# Patient Record
Sex: MALE | Race: WHITE | Employment: OTHER | URBAN - METROPOLITAN AREA
[De-identification: names, ages, dates, MRNs, and addresses within clinical notes are randomized per-mention and may not be internally consistent; named-entity substitution may affect disease eponyms.]

---

## 2017-02-03 ENCOUNTER — TELEPHONE (OUTPATIENT)
Dept: FAMILY MEDICINE CLINIC | Age: 82
End: 2017-02-03

## 2017-02-03 RX ORDER — AZITHROMYCIN 250 MG/1
TABLET, FILM COATED ORAL
Qty: 6 TABLET | Refills: 0 | Status: SHIPPED | OUTPATIENT
Start: 2017-02-03 | End: 2017-02-13

## 2019-03-13 ENCOUNTER — HOSPITAL ENCOUNTER (INPATIENT)
Age: 84
LOS: 4 days | Discharge: NURSING, REHAB OR LONG-TERM CARE | DRG: 690 | End: 2019-03-17
Attending: EMERGENCY MEDICINE | Admitting: INTERNAL MEDICINE
Payer: MEDICARE

## 2019-03-13 ENCOUNTER — APPOINTMENT (OUTPATIENT)
Dept: CT IMAGING | Age: 84
DRG: 690 | End: 2019-03-13
Payer: MEDICARE

## 2019-03-13 ENCOUNTER — APPOINTMENT (OUTPATIENT)
Dept: GENERAL RADIOLOGY | Age: 84
DRG: 690 | End: 2019-03-13
Payer: MEDICARE

## 2019-03-13 DIAGNOSIS — Z91.81 AT RISK FOR FALLS: ICD-10-CM

## 2019-03-13 DIAGNOSIS — N30.00 ACUTE CYSTITIS WITHOUT HEMATURIA: Primary | ICD-10-CM

## 2019-03-13 DIAGNOSIS — R53.1 GENERALIZED WEAKNESS: ICD-10-CM

## 2019-03-13 DIAGNOSIS — R41.0 DELIRIUM: ICD-10-CM

## 2019-03-13 DIAGNOSIS — D72.829 LEUKOCYTOSIS, UNSPECIFIED TYPE: ICD-10-CM

## 2019-03-13 PROBLEM — N39.0 UTI (URINARY TRACT INFECTION): Status: ACTIVE | Noted: 2019-03-13

## 2019-03-13 LAB
A/G RATIO: 1.3 (ref 1.1–2.2)
ALBUMIN SERPL-MCNC: 4 G/DL (ref 3.4–5)
ALP BLD-CCNC: 80 U/L (ref 40–129)
ALT SERPL-CCNC: 15 U/L (ref 10–40)
ANION GAP SERPL CALCULATED.3IONS-SCNC: 13 MMOL/L (ref 3–16)
AST SERPL-CCNC: 18 U/L (ref 15–37)
BACTERIA: ABNORMAL /HPF
BASOPHILS ABSOLUTE: 0.1 K/UL (ref 0–0.2)
BASOPHILS RELATIVE PERCENT: 0.5 %
BILIRUB SERPL-MCNC: 0.7 MG/DL (ref 0–1)
BILIRUBIN URINE: NEGATIVE
BLOOD, URINE: NEGATIVE
BUN BLDV-MCNC: 20 MG/DL (ref 7–20)
CALCIUM SERPL-MCNC: 9.2 MG/DL (ref 8.3–10.6)
CHLORIDE BLD-SCNC: 98 MMOL/L (ref 99–110)
CLARITY: CLEAR
CO2: 26 MMOL/L (ref 21–32)
COLOR: YELLOW
CREAT SERPL-MCNC: 0.9 MG/DL (ref 0.8–1.3)
EKG ATRIAL RATE: 87 BPM
EKG DIAGNOSIS: NORMAL
EKG Q-T INTERVAL: 358 MS
EKG QRS DURATION: 88 MS
EKG QTC CALCULATION (BAZETT): 412 MS
EKG R AXIS: 40 DEGREES
EKG T AXIS: -7 DEGREES
EKG VENTRICULAR RATE: 80 BPM
EOSINOPHILS ABSOLUTE: 0.2 K/UL (ref 0–0.6)
EOSINOPHILS RELATIVE PERCENT: 1.2 %
EPITHELIAL CELLS, UA: ABNORMAL /HPF
GFR AFRICAN AMERICAN: >60
GFR NON-AFRICAN AMERICAN: >60
GLOBULIN: 3.2 G/DL
GLUCOSE BLD-MCNC: 129 MG/DL (ref 70–99)
GLUCOSE URINE: NEGATIVE MG/DL
HCT VFR BLD CALC: 39.9 % (ref 40.5–52.5)
HEMOGLOBIN: 13.3 G/DL (ref 13.5–17.5)
INR BLD: 2.46 (ref 0.86–1.14)
KETONES, URINE: NEGATIVE MG/DL
LACTIC ACID: 1.2 MMOL/L (ref 0.4–2)
LEUKOCYTE ESTERASE, URINE: ABNORMAL
LYMPHOCYTES ABSOLUTE: 2 K/UL (ref 1–5.1)
LYMPHOCYTES RELATIVE PERCENT: 13.9 %
MCH RBC QN AUTO: 31.2 PG (ref 26–34)
MCHC RBC AUTO-ENTMCNC: 33.4 G/DL (ref 31–36)
MCV RBC AUTO: 93.7 FL (ref 80–100)
MICROSCOPIC EXAMINATION: YES
MONOCYTES ABSOLUTE: 1.4 K/UL (ref 0–1.3)
MONOCYTES RELATIVE PERCENT: 9.5 %
MUCUS: ABNORMAL /LPF
NEUTROPHILS ABSOLUTE: 10.9 K/UL (ref 1.7–7.7)
NEUTROPHILS RELATIVE PERCENT: 74.9 %
NITRITE, URINE: NEGATIVE
PDW BLD-RTO: 14.4 % (ref 12.4–15.4)
PH UA: 7 (ref 5–8)
PLATELET # BLD: 224 K/UL (ref 135–450)
PMV BLD AUTO: 7.3 FL (ref 5–10.5)
POTASSIUM SERPL-SCNC: 4.3 MMOL/L (ref 3.5–5.1)
PROTEIN UA: ABNORMAL MG/DL
PROTHROMBIN TIME: 28 SEC (ref 9.8–13)
RBC # BLD: 4.26 M/UL (ref 4.2–5.9)
RBC UA: ABNORMAL /HPF (ref 0–2)
SODIUM BLD-SCNC: 137 MMOL/L (ref 136–145)
SPECIFIC GRAVITY UA: 1.01 (ref 1–1.03)
SPECIMEN STATUS: NORMAL
TOTAL CK: 229 U/L (ref 39–308)
TOTAL PROTEIN: 7.2 G/DL (ref 6.4–8.2)
TROPONIN: 0.02 NG/ML
URINE TYPE: ABNORMAL
UROBILINOGEN, URINE: 0.2 E.U./DL
WBC # BLD: 14.6 K/UL (ref 4–11)
WBC UA: ABNORMAL /HPF (ref 0–5)

## 2019-03-13 PROCEDURE — 82550 ASSAY OF CK (CPK): CPT

## 2019-03-13 PROCEDURE — 93010 ELECTROCARDIOGRAM REPORT: CPT | Performed by: INTERNAL MEDICINE

## 2019-03-13 PROCEDURE — 93005 ELECTROCARDIOGRAM TRACING: CPT | Performed by: EMERGENCY MEDICINE

## 2019-03-13 PROCEDURE — 6360000002 HC RX W HCPCS: Performed by: EMERGENCY MEDICINE

## 2019-03-13 PROCEDURE — 6370000000 HC RX 637 (ALT 250 FOR IP): Performed by: INTERNAL MEDICINE

## 2019-03-13 PROCEDURE — 85025 COMPLETE CBC W/AUTO DIFF WBC: CPT

## 2019-03-13 PROCEDURE — 2700000000 HC OXYGEN THERAPY PER DAY

## 2019-03-13 PROCEDURE — 36415 COLL VENOUS BLD VENIPUNCTURE: CPT

## 2019-03-13 PROCEDURE — 96365 THER/PROPH/DIAG IV INF INIT: CPT

## 2019-03-13 PROCEDURE — 85610 PROTHROMBIN TIME: CPT

## 2019-03-13 PROCEDURE — 2580000003 HC RX 258: Performed by: EMERGENCY MEDICINE

## 2019-03-13 PROCEDURE — 87040 BLOOD CULTURE FOR BACTERIA: CPT

## 2019-03-13 PROCEDURE — 72125 CT NECK SPINE W/O DYE: CPT

## 2019-03-13 PROCEDURE — 70450 CT HEAD/BRAIN W/O DYE: CPT

## 2019-03-13 PROCEDURE — 71046 X-RAY EXAM CHEST 2 VIEWS: CPT

## 2019-03-13 PROCEDURE — 99285 EMERGENCY DEPT VISIT HI MDM: CPT

## 2019-03-13 PROCEDURE — 81001 URINALYSIS AUTO W/SCOPE: CPT

## 2019-03-13 PROCEDURE — 1200000000 HC SEMI PRIVATE

## 2019-03-13 PROCEDURE — 83605 ASSAY OF LACTIC ACID: CPT

## 2019-03-13 PROCEDURE — 2580000003 HC RX 258: Performed by: INTERNAL MEDICINE

## 2019-03-13 PROCEDURE — 80053 COMPREHEN METABOLIC PANEL: CPT

## 2019-03-13 PROCEDURE — 84484 ASSAY OF TROPONIN QUANT: CPT

## 2019-03-13 PROCEDURE — 94761 N-INVAS EAR/PLS OXIMETRY MLT: CPT

## 2019-03-13 RX ORDER — PRAVASTATIN SODIUM 40 MG
40 TABLET ORAL DAILY
Status: DISCONTINUED | OUTPATIENT
Start: 2019-03-14 | End: 2019-03-17 | Stop reason: HOSPADM

## 2019-03-13 RX ORDER — LISINOPRIL AND HYDROCHLOROTHIAZIDE 12.5; 1 MG/1; MG/1
1 TABLET ORAL DAILY
Status: DISCONTINUED | OUTPATIENT
Start: 2019-03-13 | End: 2019-03-13 | Stop reason: CLARIF

## 2019-03-13 RX ORDER — MULTIVIT-MIN/IRON/FOLIC ACID/K 18-600-40
2000 CAPSULE ORAL DAILY
COMMUNITY

## 2019-03-13 RX ORDER — SODIUM CHLORIDE 0.9 % (FLUSH) 0.9 %
10 SYRINGE (ML) INJECTION PRN
Status: DISCONTINUED | OUTPATIENT
Start: 2019-03-13 | End: 2019-03-17 | Stop reason: HOSPADM

## 2019-03-13 RX ORDER — LISINOPRIL 10 MG/1
10 TABLET ORAL DAILY
Status: DISCONTINUED | OUTPATIENT
Start: 2019-03-13 | End: 2019-03-17 | Stop reason: HOSPADM

## 2019-03-13 RX ORDER — ONDANSETRON 2 MG/ML
4 INJECTION INTRAMUSCULAR; INTRAVENOUS EVERY 6 HOURS PRN
Status: DISCONTINUED | OUTPATIENT
Start: 2019-03-13 | End: 2019-03-17 | Stop reason: HOSPADM

## 2019-03-13 RX ORDER — AZELASTINE 1 MG/ML
1 SPRAY, METERED NASAL 2 TIMES DAILY
Status: DISCONTINUED | OUTPATIENT
Start: 2019-03-14 | End: 2019-03-17 | Stop reason: HOSPADM

## 2019-03-13 RX ORDER — POTASSIUM CHLORIDE 750 MG/1
10 TABLET, EXTENDED RELEASE ORAL 2 TIMES DAILY
Status: DISCONTINUED | OUTPATIENT
Start: 2019-03-13 | End: 2019-03-17 | Stop reason: HOSPADM

## 2019-03-13 RX ORDER — HYDROCHLOROTHIAZIDE 25 MG/1
12.5 TABLET ORAL DAILY
Status: DISCONTINUED | OUTPATIENT
Start: 2019-03-13 | End: 2019-03-17 | Stop reason: HOSPADM

## 2019-03-13 RX ORDER — WARFARIN SODIUM 5 MG/1
5 TABLET ORAL DAILY
Status: DISCONTINUED | OUTPATIENT
Start: 2019-03-14 | End: 2019-03-17 | Stop reason: HOSPADM

## 2019-03-13 RX ORDER — 0.9 % SODIUM CHLORIDE 0.9 %
500 INTRAVENOUS SOLUTION INTRAVENOUS ONCE
Status: COMPLETED | OUTPATIENT
Start: 2019-03-13 | End: 2019-03-13

## 2019-03-13 RX ORDER — ACETAMINOPHEN 325 MG/1
650 TABLET ORAL EVERY 4 HOURS PRN
Status: DISCONTINUED | OUTPATIENT
Start: 2019-03-13 | End: 2019-03-17 | Stop reason: HOSPADM

## 2019-03-13 RX ORDER — DOCUSATE SODIUM 100 MG/1
100 CAPSULE, LIQUID FILLED ORAL 2 TIMES DAILY
Status: DISCONTINUED | OUTPATIENT
Start: 2019-03-13 | End: 2019-03-17 | Stop reason: HOSPADM

## 2019-03-13 RX ORDER — SODIUM CHLORIDE 0.9 % (FLUSH) 0.9 %
10 SYRINGE (ML) INJECTION EVERY 12 HOURS SCHEDULED
Status: DISCONTINUED | OUTPATIENT
Start: 2019-03-13 | End: 2019-03-17 | Stop reason: HOSPADM

## 2019-03-13 RX ORDER — DILTIAZEM HYDROCHLORIDE 240 MG/1
240 CAPSULE, COATED, EXTENDED RELEASE ORAL DAILY
Status: DISCONTINUED | OUTPATIENT
Start: 2019-03-13 | End: 2019-03-17 | Stop reason: HOSPADM

## 2019-03-13 RX ORDER — FLUOXETINE HYDROCHLORIDE 20 MG/1
20 CAPSULE ORAL DAILY
Status: DISCONTINUED | OUTPATIENT
Start: 2019-03-14 | End: 2019-03-17 | Stop reason: HOSPADM

## 2019-03-13 RX ORDER — DIGOXIN 125 MCG
250 TABLET ORAL DAILY
Status: DISCONTINUED | OUTPATIENT
Start: 2019-03-14 | End: 2019-03-17 | Stop reason: HOSPADM

## 2019-03-13 RX ORDER — CARBOXYMETHYLCELLULOSE SODIUM 10 MG/ML
1 GEL OPHTHALMIC 3 TIMES DAILY
Status: DISCONTINUED | OUTPATIENT
Start: 2019-03-13 | End: 2019-03-17 | Stop reason: HOSPADM

## 2019-03-13 RX ADMIN — CARBOXYMETHYLCELLULOSE SODIUM 1 DROP: 10 GEL OPHTHALMIC at 20:24

## 2019-03-13 RX ADMIN — DOCUSATE SODIUM 100 MG: 100 CAPSULE, LIQUID FILLED ORAL at 20:24

## 2019-03-13 RX ADMIN — POTASSIUM CHLORIDE 10 MEQ: 750 TABLET, EXTENDED RELEASE ORAL at 20:24

## 2019-03-13 RX ADMIN — HYDROCHLOROTHIAZIDE 12.5 MG: 25 TABLET ORAL at 20:23

## 2019-03-13 RX ADMIN — LISINOPRIL 10 MG: 10 TABLET ORAL at 20:24

## 2019-03-13 RX ADMIN — SODIUM CHLORIDE, PRESERVATIVE FREE 10 ML: 5 INJECTION INTRAVENOUS at 20:24

## 2019-03-13 RX ADMIN — DILTIAZEM HYDROCHLORIDE 240 MG: 240 CAPSULE, COATED, EXTENDED RELEASE ORAL at 20:24

## 2019-03-13 RX ADMIN — SODIUM CHLORIDE 500 ML: 9 INJECTION, SOLUTION INTRAVENOUS at 14:16

## 2019-03-13 RX ADMIN — CEFTRIAXONE SODIUM 1 G: 1 INJECTION, POWDER, FOR SOLUTION INTRAMUSCULAR; INTRAVENOUS at 14:43

## 2019-03-13 ASSESSMENT — PAIN SCALES - GENERAL: PAINLEVEL_OUTOF10: 0

## 2019-03-14 LAB
ALBUMIN SERPL-MCNC: 4 G/DL (ref 3.4–5)
ANION GAP SERPL CALCULATED.3IONS-SCNC: 12 MMOL/L (ref 3–16)
BUN BLDV-MCNC: 16 MG/DL (ref 7–20)
CALCIUM SERPL-MCNC: 9.1 MG/DL (ref 8.3–10.6)
CHLORIDE BLD-SCNC: 100 MMOL/L (ref 99–110)
CO2: 24 MMOL/L (ref 21–32)
CREAT SERPL-MCNC: 0.7 MG/DL (ref 0.8–1.3)
GFR AFRICAN AMERICAN: >60
GFR NON-AFRICAN AMERICAN: >60
GLUCOSE BLD-MCNC: 177 MG/DL (ref 70–99)
HCT VFR BLD CALC: 39.7 % (ref 40.5–52.5)
HEMOGLOBIN: 13.6 G/DL (ref 13.5–17.5)
INR BLD: 2.32 (ref 0.86–1.14)
MCH RBC QN AUTO: 31.6 PG (ref 26–34)
MCHC RBC AUTO-ENTMCNC: 34.3 G/DL (ref 31–36)
MCV RBC AUTO: 92.2 FL (ref 80–100)
PDW BLD-RTO: 14.4 % (ref 12.4–15.4)
PHOSPHORUS: 2.2 MG/DL (ref 2.5–4.9)
PLATELET # BLD: 206 K/UL (ref 135–450)
PMV BLD AUTO: 7.7 FL (ref 5–10.5)
POTASSIUM SERPL-SCNC: 4.1 MMOL/L (ref 3.5–5.1)
PROTHROMBIN TIME: 26.5 SEC (ref 9.8–13)
RBC # BLD: 4.3 M/UL (ref 4.2–5.9)
SODIUM BLD-SCNC: 136 MMOL/L (ref 136–145)
WBC # BLD: 17.3 K/UL (ref 4–11)

## 2019-03-14 PROCEDURE — 1200000000 HC SEMI PRIVATE

## 2019-03-14 PROCEDURE — 6370000000 HC RX 637 (ALT 250 FOR IP): Performed by: INTERNAL MEDICINE

## 2019-03-14 PROCEDURE — 97530 THERAPEUTIC ACTIVITIES: CPT

## 2019-03-14 PROCEDURE — 97535 SELF CARE MNGMENT TRAINING: CPT

## 2019-03-14 PROCEDURE — 6360000002 HC RX W HCPCS: Performed by: INTERNAL MEDICINE

## 2019-03-14 PROCEDURE — 80069 RENAL FUNCTION PANEL: CPT

## 2019-03-14 PROCEDURE — 85610 PROTHROMBIN TIME: CPT

## 2019-03-14 PROCEDURE — 97162 PT EVAL MOD COMPLEX 30 MIN: CPT

## 2019-03-14 PROCEDURE — 85027 COMPLETE CBC AUTOMATED: CPT

## 2019-03-14 PROCEDURE — 97110 THERAPEUTIC EXERCISES: CPT

## 2019-03-14 PROCEDURE — 2580000003 HC RX 258: Performed by: INTERNAL MEDICINE

## 2019-03-14 PROCEDURE — 36415 COLL VENOUS BLD VENIPUNCTURE: CPT

## 2019-03-14 PROCEDURE — 97167 OT EVAL HIGH COMPLEX 60 MIN: CPT

## 2019-03-14 RX ORDER — SODIUM CHLORIDE 9 MG/ML
INJECTION, SOLUTION INTRAVENOUS
Status: DISPENSED
Start: 2019-03-14 | End: 2019-03-14

## 2019-03-14 RX ADMIN — POTASSIUM CHLORIDE 10 MEQ: 750 TABLET, EXTENDED RELEASE ORAL at 21:36

## 2019-03-14 RX ADMIN — HYDROCHLOROTHIAZIDE 12.5 MG: 25 TABLET ORAL at 09:27

## 2019-03-14 RX ADMIN — AZELASTINE HYDROCHLORIDE 1 SPRAY: 137 SPRAY, METERED NASAL at 21:38

## 2019-03-14 RX ADMIN — CARBOXYMETHYLCELLULOSE SODIUM 1 DROP: 10 GEL OPHTHALMIC at 21:38

## 2019-03-14 RX ADMIN — SODIUM CHLORIDE, PRESERVATIVE FREE 10 ML: 5 INJECTION INTRAVENOUS at 21:37

## 2019-03-14 RX ADMIN — AZELASTINE HYDROCHLORIDE 1 SPRAY: 137 SPRAY, METERED NASAL at 12:45

## 2019-03-14 RX ADMIN — LISINOPRIL 10 MG: 10 TABLET ORAL at 09:27

## 2019-03-14 RX ADMIN — FLUOXETINE 20 MG: 20 CAPSULE ORAL at 09:27

## 2019-03-14 RX ADMIN — DOCUSATE SODIUM 100 MG: 100 CAPSULE, LIQUID FILLED ORAL at 09:27

## 2019-03-14 RX ADMIN — DILTIAZEM HYDROCHLORIDE 240 MG: 240 CAPSULE, COATED, EXTENDED RELEASE ORAL at 09:27

## 2019-03-14 RX ADMIN — PRAVASTATIN SODIUM 40 MG: 40 TABLET ORAL at 09:27

## 2019-03-14 RX ADMIN — DIGOXIN 250 MCG: 125 TABLET ORAL at 09:27

## 2019-03-14 RX ADMIN — WARFARIN SODIUM 5 MG: 5 TABLET ORAL at 17:38

## 2019-03-14 RX ADMIN — CEFTRIAXONE SODIUM 1 G: 1 INJECTION, POWDER, FOR SOLUTION INTRAMUSCULAR; INTRAVENOUS at 09:27

## 2019-03-14 RX ADMIN — POTASSIUM CHLORIDE 10 MEQ: 750 TABLET, EXTENDED RELEASE ORAL at 09:27

## 2019-03-14 RX ADMIN — CARBOXYMETHYLCELLULOSE SODIUM 1 DROP: 10 GEL OPHTHALMIC at 09:43

## 2019-03-14 RX ADMIN — SODIUM CHLORIDE, PRESERVATIVE FREE 10 ML: 5 INJECTION INTRAVENOUS at 09:27

## 2019-03-14 RX ADMIN — DOCUSATE SODIUM 100 MG: 100 CAPSULE, LIQUID FILLED ORAL at 21:36

## 2019-03-14 RX ADMIN — CARBOXYMETHYLCELLULOSE SODIUM 1 DROP: 10 GEL OPHTHALMIC at 15:17

## 2019-03-14 ASSESSMENT — PAIN DESCRIPTION - PAIN TYPE
TYPE: CHRONIC PAIN

## 2019-03-14 ASSESSMENT — ENCOUNTER SYMPTOMS
COLOR CHANGE: 0
ABDOMINAL PAIN: 0
BACK PAIN: 1
SHORTNESS OF BREATH: 0
CHEST TIGHTNESS: 0
COUGH: 0
NAUSEA: 0
DIARRHEA: 0
VOMITING: 0

## 2019-03-14 ASSESSMENT — PAIN SCALES - GENERAL
PAINLEVEL_OUTOF10: 1
PAINLEVEL_OUTOF10: 1
PAINLEVEL_OUTOF10: 2

## 2019-03-14 ASSESSMENT — PAIN DESCRIPTION - LOCATION
LOCATION: SHOULDER
LOCATION: SHOULDER

## 2019-03-15 LAB
INR BLD: 2.13 (ref 0.86–1.14)
PROTHROMBIN TIME: 24.3 SEC (ref 9.8–13)

## 2019-03-15 PROCEDURE — 6360000002 HC RX W HCPCS: Performed by: INTERNAL MEDICINE

## 2019-03-15 PROCEDURE — 2580000003 HC RX 258: Performed by: INTERNAL MEDICINE

## 2019-03-15 PROCEDURE — 85610 PROTHROMBIN TIME: CPT

## 2019-03-15 PROCEDURE — 6370000000 HC RX 637 (ALT 250 FOR IP): Performed by: INTERNAL MEDICINE

## 2019-03-15 PROCEDURE — 1200000000 HC SEMI PRIVATE

## 2019-03-15 PROCEDURE — 36415 COLL VENOUS BLD VENIPUNCTURE: CPT

## 2019-03-15 RX ADMIN — DIGOXIN 250 MCG: 125 TABLET ORAL at 09:13

## 2019-03-15 RX ADMIN — POTASSIUM CHLORIDE 10 MEQ: 750 TABLET, EXTENDED RELEASE ORAL at 23:11

## 2019-03-15 RX ADMIN — DOCUSATE SODIUM 100 MG: 100 CAPSULE, LIQUID FILLED ORAL at 23:11

## 2019-03-15 RX ADMIN — LISINOPRIL 10 MG: 10 TABLET ORAL at 09:13

## 2019-03-15 RX ADMIN — POTASSIUM CHLORIDE 10 MEQ: 750 TABLET, EXTENDED RELEASE ORAL at 09:12

## 2019-03-15 RX ADMIN — AZELASTINE HYDROCHLORIDE 1 SPRAY: 137 SPRAY, METERED NASAL at 09:14

## 2019-03-15 RX ADMIN — DOCUSATE SODIUM 100 MG: 100 CAPSULE, LIQUID FILLED ORAL at 10:03

## 2019-03-15 RX ADMIN — WARFARIN SODIUM 5 MG: 5 TABLET ORAL at 16:07

## 2019-03-15 RX ADMIN — FLUOXETINE 20 MG: 20 CAPSULE ORAL at 09:12

## 2019-03-15 RX ADMIN — AZELASTINE HYDROCHLORIDE 1 SPRAY: 137 SPRAY, METERED NASAL at 23:11

## 2019-03-15 RX ADMIN — SODIUM CHLORIDE, PRESERVATIVE FREE 10 ML: 5 INJECTION INTRAVENOUS at 23:16

## 2019-03-15 RX ADMIN — SODIUM CHLORIDE, PRESERVATIVE FREE 10 ML: 5 INJECTION INTRAVENOUS at 18:18

## 2019-03-15 RX ADMIN — CARBOXYMETHYLCELLULOSE SODIUM 1 DROP: 10 GEL OPHTHALMIC at 09:12

## 2019-03-15 RX ADMIN — ACETAMINOPHEN 650 MG: 325 TABLET ORAL at 02:23

## 2019-03-15 RX ADMIN — ONDANSETRON 4 MG: 2 INJECTION INTRAMUSCULAR; INTRAVENOUS at 18:18

## 2019-03-15 RX ADMIN — HYDROCHLOROTHIAZIDE 12.5 MG: 25 TABLET ORAL at 09:13

## 2019-03-15 RX ADMIN — PRAVASTATIN SODIUM 40 MG: 40 TABLET ORAL at 09:12

## 2019-03-15 RX ADMIN — CARBOXYMETHYLCELLULOSE SODIUM 1 DROP: 10 GEL OPHTHALMIC at 23:15

## 2019-03-15 RX ADMIN — ACETAMINOPHEN 650 MG: 325 TABLET ORAL at 23:12

## 2019-03-15 RX ADMIN — SODIUM CHLORIDE, PRESERVATIVE FREE 10 ML: 5 INJECTION INTRAVENOUS at 09:13

## 2019-03-15 RX ADMIN — CEFTRIAXONE SODIUM 1 G: 1 INJECTION, POWDER, FOR SOLUTION INTRAMUSCULAR; INTRAVENOUS at 09:13

## 2019-03-15 RX ADMIN — DILTIAZEM HYDROCHLORIDE 240 MG: 240 CAPSULE, COATED, EXTENDED RELEASE ORAL at 09:12

## 2019-03-15 RX ADMIN — CARBOXYMETHYLCELLULOSE SODIUM 1 DROP: 10 GEL OPHTHALMIC at 16:08

## 2019-03-15 ASSESSMENT — PAIN SCALES - GENERAL
PAINLEVEL_OUTOF10: 2
PAINLEVEL_OUTOF10: 0
PAINLEVEL_OUTOF10: 2
PAINLEVEL_OUTOF10: 2

## 2019-03-15 ASSESSMENT — PAIN DESCRIPTION - ORIENTATION: ORIENTATION: RIGHT;LEFT

## 2019-03-15 ASSESSMENT — PAIN DESCRIPTION - LOCATION
LOCATION: SHOULDER
LOCATION: WRIST;SHOULDER

## 2019-03-15 ASSESSMENT — PAIN DESCRIPTION - PAIN TYPE
TYPE: CHRONIC PAIN
TYPE: CHRONIC PAIN

## 2019-03-16 LAB
ALBUMIN SERPL-MCNC: 3.4 G/DL (ref 3.4–5)
ANION GAP SERPL CALCULATED.3IONS-SCNC: 12 MMOL/L (ref 3–16)
BUN BLDV-MCNC: 42 MG/DL (ref 7–20)
CALCIUM SERPL-MCNC: 9.1 MG/DL (ref 8.3–10.6)
CHLORIDE BLD-SCNC: 99 MMOL/L (ref 99–110)
CO2: 27 MMOL/L (ref 21–32)
CREAT SERPL-MCNC: 1 MG/DL (ref 0.8–1.3)
GFR AFRICAN AMERICAN: >60
GFR NON-AFRICAN AMERICAN: >60
GLUCOSE BLD-MCNC: 144 MG/DL (ref 70–99)
HCT VFR BLD CALC: 39.6 % (ref 40.5–52.5)
HEMOGLOBIN: 13.5 G/DL (ref 13.5–17.5)
INR BLD: 2.54 (ref 0.86–1.14)
MCH RBC QN AUTO: 31.5 PG (ref 26–34)
MCHC RBC AUTO-ENTMCNC: 34 G/DL (ref 31–36)
MCV RBC AUTO: 92.5 FL (ref 80–100)
PDW BLD-RTO: 14.6 % (ref 12.4–15.4)
PHOSPHORUS: 3.2 MG/DL (ref 2.5–4.9)
PLATELET # BLD: 231 K/UL (ref 135–450)
PMV BLD AUTO: 7.9 FL (ref 5–10.5)
POTASSIUM SERPL-SCNC: 4.1 MMOL/L (ref 3.5–5.1)
PROTHROMBIN TIME: 28.9 SEC (ref 9.8–13)
RBC # BLD: 4.28 M/UL (ref 4.2–5.9)
SODIUM BLD-SCNC: 138 MMOL/L (ref 136–145)
WBC # BLD: 13.9 K/UL (ref 4–11)

## 2019-03-16 PROCEDURE — 51798 US URINE CAPACITY MEASURE: CPT

## 2019-03-16 PROCEDURE — 51701 INSERT BLADDER CATHETER: CPT

## 2019-03-16 PROCEDURE — 85027 COMPLETE CBC AUTOMATED: CPT

## 2019-03-16 PROCEDURE — 2580000003 HC RX 258: Performed by: INTERNAL MEDICINE

## 2019-03-16 PROCEDURE — 6370000000 HC RX 637 (ALT 250 FOR IP): Performed by: INTERNAL MEDICINE

## 2019-03-16 PROCEDURE — 85610 PROTHROMBIN TIME: CPT

## 2019-03-16 PROCEDURE — 36415 COLL VENOUS BLD VENIPUNCTURE: CPT

## 2019-03-16 PROCEDURE — 80069 RENAL FUNCTION PANEL: CPT

## 2019-03-16 PROCEDURE — 1200000000 HC SEMI PRIVATE

## 2019-03-16 PROCEDURE — 6370000000 HC RX 637 (ALT 250 FOR IP): Performed by: FAMILY MEDICINE

## 2019-03-16 PROCEDURE — 6360000002 HC RX W HCPCS: Performed by: INTERNAL MEDICINE

## 2019-03-16 RX ORDER — TAMSULOSIN HYDROCHLORIDE 0.4 MG/1
0.4 CAPSULE ORAL NIGHTLY
Status: DISCONTINUED | OUTPATIENT
Start: 2019-03-16 | End: 2019-03-17 | Stop reason: HOSPADM

## 2019-03-16 RX ADMIN — DILTIAZEM HYDROCHLORIDE 240 MG: 240 CAPSULE, COATED, EXTENDED RELEASE ORAL at 09:49

## 2019-03-16 RX ADMIN — POTASSIUM CHLORIDE 10 MEQ: 750 TABLET, EXTENDED RELEASE ORAL at 09:48

## 2019-03-16 RX ADMIN — AZELASTINE HYDROCHLORIDE 1 SPRAY: 137 SPRAY, METERED NASAL at 21:07

## 2019-03-16 RX ADMIN — WARFARIN SODIUM 5 MG: 5 TABLET ORAL at 18:02

## 2019-03-16 RX ADMIN — CARBOXYMETHYLCELLULOSE SODIUM 1 DROP: 10 GEL OPHTHALMIC at 15:21

## 2019-03-16 RX ADMIN — DOCUSATE SODIUM 100 MG: 100 CAPSULE, LIQUID FILLED ORAL at 21:06

## 2019-03-16 RX ADMIN — LISINOPRIL 10 MG: 10 TABLET ORAL at 09:49

## 2019-03-16 RX ADMIN — SODIUM CHLORIDE, PRESERVATIVE FREE 10 ML: 5 INJECTION INTRAVENOUS at 09:50

## 2019-03-16 RX ADMIN — FLUOXETINE 20 MG: 20 CAPSULE ORAL at 09:49

## 2019-03-16 RX ADMIN — CARBOXYMETHYLCELLULOSE SODIUM 1 DROP: 10 GEL OPHTHALMIC at 21:08

## 2019-03-16 RX ADMIN — CEFTRIAXONE SODIUM 1 G: 1 INJECTION, POWDER, FOR SOLUTION INTRAMUSCULAR; INTRAVENOUS at 09:50

## 2019-03-16 RX ADMIN — DIGOXIN 250 MCG: 125 TABLET ORAL at 09:49

## 2019-03-16 RX ADMIN — PRAVASTATIN SODIUM 40 MG: 40 TABLET ORAL at 09:48

## 2019-03-16 RX ADMIN — DOCUSATE SODIUM 100 MG: 100 CAPSULE, LIQUID FILLED ORAL at 09:49

## 2019-03-16 RX ADMIN — TAMSULOSIN HYDROCHLORIDE 0.4 MG: 0.4 CAPSULE ORAL at 21:06

## 2019-03-16 RX ADMIN — HYDROCHLOROTHIAZIDE 12.5 MG: 25 TABLET ORAL at 09:49

## 2019-03-16 RX ADMIN — AZELASTINE HYDROCHLORIDE 1 SPRAY: 137 SPRAY, METERED NASAL at 09:52

## 2019-03-16 RX ADMIN — POTASSIUM CHLORIDE 10 MEQ: 750 TABLET, EXTENDED RELEASE ORAL at 21:06

## 2019-03-16 RX ADMIN — SODIUM CHLORIDE, PRESERVATIVE FREE 10 ML: 5 INJECTION INTRAVENOUS at 21:06

## 2019-03-16 ASSESSMENT — PAIN DESCRIPTION - LOCATION: LOCATION: ABDOMEN

## 2019-03-16 ASSESSMENT — PAIN DESCRIPTION - PAIN TYPE: TYPE: ACUTE PAIN

## 2019-03-16 ASSESSMENT — PAIN DESCRIPTION - ORIENTATION: ORIENTATION: MID

## 2019-03-16 ASSESSMENT — PAIN SCALES - GENERAL
PAINLEVEL_OUTOF10: 3
PAINLEVEL_OUTOF10: 0

## 2019-03-17 VITALS
SYSTOLIC BLOOD PRESSURE: 168 MMHG | WEIGHT: 192 LBS | HEART RATE: 87 BPM | DIASTOLIC BLOOD PRESSURE: 77 MMHG | HEIGHT: 72 IN | RESPIRATION RATE: 16 BRPM | OXYGEN SATURATION: 96 % | TEMPERATURE: 98.2 F | BODY MASS INDEX: 26.01 KG/M2

## 2019-03-17 LAB
CULTURE, BLOOD 2: ABNORMAL
CULTURE, BLOOD 2: ABNORMAL
INR BLD: 3.32 (ref 0.86–1.14)
ORGANISM: ABNORMAL
PROTHROMBIN TIME: 37.9 SEC (ref 9.8–13)

## 2019-03-17 PROCEDURE — 85610 PROTHROMBIN TIME: CPT

## 2019-03-17 PROCEDURE — 36415 COLL VENOUS BLD VENIPUNCTURE: CPT

## 2019-03-17 PROCEDURE — 6370000000 HC RX 637 (ALT 250 FOR IP): Performed by: INTERNAL MEDICINE

## 2019-03-17 PROCEDURE — 6360000002 HC RX W HCPCS: Performed by: INTERNAL MEDICINE

## 2019-03-17 PROCEDURE — 2580000003 HC RX 258: Performed by: INTERNAL MEDICINE

## 2019-03-17 RX ORDER — TAMSULOSIN HYDROCHLORIDE 0.4 MG/1
0.4 CAPSULE ORAL NIGHTLY
Qty: 30 CAPSULE | Refills: 3
Start: 2019-03-17

## 2019-03-17 RX ORDER — WARFARIN SODIUM 5 MG/1
TABLET ORAL
Qty: 30 TABLET | Refills: 3
Start: 2019-03-18 | End: 2019-07-30

## 2019-03-17 RX ADMIN — CEFTRIAXONE SODIUM 1 G: 1 INJECTION, POWDER, FOR SOLUTION INTRAMUSCULAR; INTRAVENOUS at 08:13

## 2019-03-17 RX ADMIN — LISINOPRIL 10 MG: 10 TABLET ORAL at 08:13

## 2019-03-17 RX ADMIN — IPRATROPIUM BROMIDE AND ALBUTEROL 2 PUFF: 20; 100 SPRAY, METERED RESPIRATORY (INHALATION) at 08:14

## 2019-03-17 RX ADMIN — HYDROCHLOROTHIAZIDE 12.5 MG: 25 TABLET ORAL at 08:13

## 2019-03-17 RX ADMIN — DOCUSATE SODIUM 100 MG: 100 CAPSULE, LIQUID FILLED ORAL at 08:13

## 2019-03-17 RX ADMIN — AZELASTINE HYDROCHLORIDE 1 SPRAY: 137 SPRAY, METERED NASAL at 08:14

## 2019-03-17 RX ADMIN — POTASSIUM CHLORIDE 10 MEQ: 750 TABLET, EXTENDED RELEASE ORAL at 08:13

## 2019-03-17 RX ADMIN — PRAVASTATIN SODIUM 40 MG: 40 TABLET ORAL at 08:13

## 2019-03-17 RX ADMIN — DILTIAZEM HYDROCHLORIDE 240 MG: 240 CAPSULE, COATED, EXTENDED RELEASE ORAL at 08:12

## 2019-03-17 RX ADMIN — DIGOXIN 250 MCG: 125 TABLET ORAL at 08:13

## 2019-03-17 RX ADMIN — CARBOXYMETHYLCELLULOSE SODIUM 1 DROP: 10 GEL OPHTHALMIC at 08:12

## 2019-03-17 RX ADMIN — FLUOXETINE 20 MG: 20 CAPSULE ORAL at 08:30

## 2019-03-17 RX ADMIN — SODIUM CHLORIDE, PRESERVATIVE FREE 10 ML: 5 INJECTION INTRAVENOUS at 08:13

## 2019-03-18 LAB — BLOOD CULTURE, ROUTINE: NORMAL

## 2019-04-12 PROBLEM — N39.0 UTI (URINARY TRACT INFECTION): Status: RESOLVED | Noted: 2019-03-13 | Resolved: 2019-04-12

## 2019-07-30 ENCOUNTER — HOSPITAL ENCOUNTER (EMERGENCY)
Age: 84
Discharge: OTHER INSTITUTION | End: 2019-07-30
Attending: EMERGENCY MEDICINE
Payer: MEDICARE

## 2019-07-30 VITALS
RESPIRATION RATE: 16 BRPM | SYSTOLIC BLOOD PRESSURE: 144 MMHG | OXYGEN SATURATION: 99 % | DIASTOLIC BLOOD PRESSURE: 75 MMHG | BODY MASS INDEX: 25.45 KG/M2 | WEIGHT: 192 LBS | HEART RATE: 78 BPM | TEMPERATURE: 98.2 F | HEIGHT: 73 IN

## 2019-07-30 DIAGNOSIS — T83.090A MECHANICAL COMPLICATION OF SUPRAPUBIC CATHETER, INITIAL ENCOUNTER (HCC): Primary | ICD-10-CM

## 2019-07-30 PROCEDURE — 99283 EMERGENCY DEPT VISIT LOW MDM: CPT

## 2019-07-30 PROCEDURE — 51702 INSERT TEMP BLADDER CATH: CPT

## 2019-07-30 RX ORDER — CIPROFLOXACIN 500 MG/1
500 TABLET, FILM COATED ORAL 2 TIMES DAILY
Status: ON HOLD | COMMUNITY
End: 2021-10-14 | Stop reason: HOSPADM

## 2019-07-30 RX ORDER — WATER / MINERAL OIL / WHITE PETROLATUM 16 OZ
CREAM TOPICAL PRN
COMMUNITY

## 2019-07-30 RX ORDER — FLUTICASONE FUROATE AND VILANTEROL 100; 25 UG/1; UG/1
2 POWDER RESPIRATORY (INHALATION) 2 TIMES DAILY
COMMUNITY

## 2019-07-30 RX ORDER — LISINOPRIL 20 MG/1
20 TABLET ORAL DAILY
COMMUNITY

## 2019-07-30 RX ORDER — LORAZEPAM 0.5 MG/1
0.5 TABLET ORAL EVERY 12 HOURS
COMMUNITY

## 2019-07-30 RX ORDER — LANOLIN ALCOHOL/MO/W.PET/CERES
9 CREAM (GRAM) TOPICAL DAILY
COMMUNITY

## 2019-08-11 ENCOUNTER — APPOINTMENT (OUTPATIENT)
Dept: CT IMAGING | Age: 84
End: 2019-08-11
Payer: MEDICARE

## 2019-08-11 ENCOUNTER — APPOINTMENT (OUTPATIENT)
Dept: GENERAL RADIOLOGY | Age: 84
End: 2019-08-11
Payer: MEDICARE

## 2019-08-11 ENCOUNTER — HOSPITAL ENCOUNTER (EMERGENCY)
Age: 84
Discharge: NURSING, REHAB OR LONG-TERM CARE | End: 2019-08-11
Attending: EMERGENCY MEDICINE
Payer: MEDICARE

## 2019-08-11 VITALS
HEIGHT: 70 IN | SYSTOLIC BLOOD PRESSURE: 164 MMHG | OXYGEN SATURATION: 100 % | DIASTOLIC BLOOD PRESSURE: 84 MMHG | TEMPERATURE: 98.2 F | HEART RATE: 85 BPM | RESPIRATION RATE: 20 BRPM | BODY MASS INDEX: 27.2 KG/M2 | WEIGHT: 190 LBS
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
## 2019-08-11 LAB
A/G RATIO: 1.2 (ref 1.1–2.2)
ALBUMIN SERPL-MCNC: 4.2 G/DL (ref 3.4–5)
ALP BLD-CCNC: 103 U/L (ref 40–129)
ALT SERPL-CCNC: 26 U/L (ref 10–40)
ANION GAP SERPL CALCULATED.3IONS-SCNC: 9 MMOL/L (ref 3–16)
AST SERPL-CCNC: 27 U/L (ref 15–37)
BASOPHILS ABSOLUTE: 0.1 K/UL (ref 0–0.2)
BASOPHILS RELATIVE PERCENT: 0.7 %
BILIRUB SERPL-MCNC: 0.8 MG/DL (ref 0–1)
BUN BLDV-MCNC: 12 MG/DL (ref 7–20)
CALCIUM SERPL-MCNC: 9.9 MG/DL (ref 8.3–10.6)
CHLORIDE BLD-SCNC: 98 MMOL/L (ref 99–110)
CO2: 28 MMOL/L (ref 21–32)
CREAT SERPL-MCNC: 0.6 MG/DL (ref 0.8–1.3)
EKG ATRIAL RATE: 107 BPM
EKG DIAGNOSIS: NORMAL
EKG Q-T INTERVAL: 322 MS
EKG QRS DURATION: 84 MS
EKG QTC CALCULATION (BAZETT): 411 MS
EKG R AXIS: 49 DEGREES
EKG T AXIS: 57 DEGREES
EKG VENTRICULAR RATE: 98 BPM
EOSINOPHILS ABSOLUTE: 0.2 K/UL (ref 0–0.6)
EOSINOPHILS RELATIVE PERCENT: 2.2 %
GFR AFRICAN AMERICAN: >60
GFR NON-AFRICAN AMERICAN: >60
GLOBULIN: 3.6 G/DL
GLUCOSE BLD-MCNC: 110 MG/DL (ref 70–99)
HCT VFR BLD CALC: 39.7 % (ref 40.5–52.5)
HEMOGLOBIN: 12.9 G/DL (ref 13.5–17.5)
LYMPHOCYTES ABSOLUTE: 0.9 K/UL (ref 1–5.1)
LYMPHOCYTES RELATIVE PERCENT: 10 %
MCH RBC QN AUTO: 29.8 PG (ref 26–34)
MCHC RBC AUTO-ENTMCNC: 32.5 G/DL (ref 31–36)
MCV RBC AUTO: 91.6 FL (ref 80–100)
MONOCYTES ABSOLUTE: 0.9 K/UL (ref 0–1.3)
MONOCYTES RELATIVE PERCENT: 9.6 %
NEUTROPHILS ABSOLUTE: 7 K/UL (ref 1.7–7.7)
NEUTROPHILS RELATIVE PERCENT: 77.5 %
PDW BLD-RTO: 15 % (ref 12.4–15.4)
PLATELET # BLD: 231 K/UL (ref 135–450)
PMV BLD AUTO: 7.6 FL (ref 5–10.5)
POTASSIUM REFLEX MAGNESIUM: 4.4 MMOL/L (ref 3.5–5.1)
RBC # BLD: 4.34 M/UL (ref 4.2–5.9)
SODIUM BLD-SCNC: 135 MMOL/L (ref 136–145)
TOTAL PROTEIN: 7.8 G/DL (ref 6.4–8.2)
TROPONIN: <0.01 NG/ML
WBC # BLD: 9.1 K/UL (ref 4–11)

## 2019-08-11 PROCEDURE — 36415 COLL VENOUS BLD VENIPUNCTURE: CPT

## 2019-08-11 PROCEDURE — 72125 CT NECK SPINE W/O DYE: CPT

## 2019-08-11 PROCEDURE — 80053 COMPREHEN METABOLIC PANEL: CPT

## 2019-08-11 PROCEDURE — 85025 COMPLETE CBC W/AUTO DIFF WBC: CPT

## 2019-08-11 PROCEDURE — 73560 X-RAY EXAM OF KNEE 1 OR 2: CPT

## 2019-08-11 PROCEDURE — 93005 ELECTROCARDIOGRAM TRACING: CPT | Performed by: EMERGENCY MEDICINE

## 2019-08-11 PROCEDURE — 84484 ASSAY OF TROPONIN QUANT: CPT

## 2019-08-11 PROCEDURE — 70450 CT HEAD/BRAIN W/O DYE: CPT

## 2019-08-11 PROCEDURE — 93010 ELECTROCARDIOGRAM REPORT: CPT | Performed by: INTERNAL MEDICINE

## 2019-08-11 PROCEDURE — 73070 X-RAY EXAM OF ELBOW: CPT

## 2019-08-11 PROCEDURE — 99285 EMERGENCY DEPT VISIT HI MDM: CPT

## 2019-08-11 ASSESSMENT — PAIN SCALES - GENERAL: PAINLEVEL_OUTOF10: 2

## 2019-08-11 ASSESSMENT — PAIN DESCRIPTION - DESCRIPTORS: DESCRIPTORS: ACHING;DISCOMFORT

## 2019-08-11 ASSESSMENT — PAIN DESCRIPTION - PAIN TYPE: TYPE: ACUTE PAIN

## 2019-08-11 ASSESSMENT — PAIN DESCRIPTION - FREQUENCY: FREQUENCY: INTERMITTENT

## 2019-08-11 ASSESSMENT — PAIN DESCRIPTION - LOCATION: LOCATION: HEAD

## 2019-08-11 NOTE — ED NOTES
Discharge instructions given to caregiver. Caregiver verbalized understanding. No distress noted. Pt and caregiver ambulatory from department without difficulty.      Da Ruiz RN  08/11/19 5041

## 2021-04-09 ENCOUNTER — APPOINTMENT (OUTPATIENT)
Dept: GENERAL RADIOLOGY | Age: 86
End: 2021-04-09
Payer: OTHER GOVERNMENT

## 2021-04-09 ENCOUNTER — HOSPITAL ENCOUNTER (EMERGENCY)
Age: 86
Discharge: ANOTHER HOSPITAL | End: 2021-04-09
Attending: EMERGENCY MEDICINE
Payer: OTHER GOVERNMENT

## 2021-04-09 VITALS
RESPIRATION RATE: 28 BRPM | BODY MASS INDEX: 27.2 KG/M2 | WEIGHT: 190 LBS | HEIGHT: 70 IN | DIASTOLIC BLOOD PRESSURE: 60 MMHG | SYSTOLIC BLOOD PRESSURE: 104 MMHG | OXYGEN SATURATION: 96 % | HEART RATE: 85 BPM | TEMPERATURE: 98.8 F

## 2021-04-09 DIAGNOSIS — N39.0 ACUTE UTI: Primary | ICD-10-CM

## 2021-04-09 DIAGNOSIS — R53.1 GENERALIZED WEAKNESS: ICD-10-CM

## 2021-04-09 DIAGNOSIS — I50.9 ACUTE ON CHRONIC CONGESTIVE HEART FAILURE, UNSPECIFIED HEART FAILURE TYPE (HCC): ICD-10-CM

## 2021-04-09 LAB
A/G RATIO: 1.2 (ref 1.1–2.2)
ALBUMIN SERPL-MCNC: 4.1 G/DL (ref 3.4–5)
ALP BLD-CCNC: 104 U/L (ref 40–129)
ALT SERPL-CCNC: 28 U/L (ref 10–40)
ANION GAP SERPL CALCULATED.3IONS-SCNC: 10 MMOL/L (ref 3–16)
AST SERPL-CCNC: 23 U/L (ref 15–37)
BACTERIA: ABNORMAL /HPF
BASOPHILS ABSOLUTE: 0.1 K/UL (ref 0–0.2)
BASOPHILS RELATIVE PERCENT: 0.4 %
BILIRUB SERPL-MCNC: 0.9 MG/DL (ref 0–1)
BILIRUBIN URINE: NEGATIVE
BLOOD, URINE: ABNORMAL
BUN BLDV-MCNC: 15 MG/DL (ref 7–20)
CALCIUM SERPL-MCNC: 9.9 MG/DL (ref 8.3–10.6)
CHLORIDE BLD-SCNC: 96 MMOL/L (ref 99–110)
CLARITY: ABNORMAL
CO2: 28 MMOL/L (ref 21–32)
COLOR: YELLOW
CREAT SERPL-MCNC: 0.7 MG/DL (ref 0.8–1.3)
EKG ATRIAL RATE: 227 BPM
EKG DIAGNOSIS: NORMAL
EKG Q-T INTERVAL: 380 MS
EKG QRS DURATION: 82 MS
EKG QTC CALCULATION (BAZETT): 446 MS
EKG R AXIS: 38 DEGREES
EKG T AXIS: 93 DEGREES
EKG VENTRICULAR RATE: 83 BPM
EOSINOPHILS ABSOLUTE: 0 K/UL (ref 0–0.6)
EOSINOPHILS RELATIVE PERCENT: 0.2 %
EPITHELIAL CELLS, UA: ABNORMAL /HPF (ref 0–5)
GFR AFRICAN AMERICAN: >60
GFR NON-AFRICAN AMERICAN: >60
GLOBULIN: 3.4 G/DL
GLUCOSE BLD-MCNC: 117 MG/DL (ref 70–99)
GLUCOSE URINE: NEGATIVE MG/DL
HCT VFR BLD CALC: 34.3 % (ref 40.5–52.5)
HEMOGLOBIN: 11.2 G/DL (ref 13.5–17.5)
KETONES, URINE: NEGATIVE MG/DL
LACTIC ACID: 1.5 MMOL/L (ref 0.4–2)
LEUKOCYTE ESTERASE, URINE: ABNORMAL
LYMPHOCYTES ABSOLUTE: 1.1 K/UL (ref 1–5.1)
LYMPHOCYTES RELATIVE PERCENT: 6.8 %
MCH RBC QN AUTO: 29.9 PG (ref 26–34)
MCHC RBC AUTO-ENTMCNC: 32.6 G/DL (ref 31–36)
MCV RBC AUTO: 91.6 FL (ref 80–100)
MICROSCOPIC EXAMINATION: YES
MONOCYTES ABSOLUTE: 1.6 K/UL (ref 0–1.3)
MONOCYTES RELATIVE PERCENT: 9.8 %
NEUTROPHILS ABSOLUTE: 13.2 K/UL (ref 1.7–7.7)
NEUTROPHILS RELATIVE PERCENT: 82.8 %
NITRITE, URINE: POSITIVE
PDW BLD-RTO: 14.7 % (ref 12.4–15.4)
PH UA: 7 (ref 5–8)
PLATELET # BLD: 246 K/UL (ref 135–450)
PMV BLD AUTO: 8.2 FL (ref 5–10.5)
POTASSIUM SERPL-SCNC: 4.5 MMOL/L (ref 3.5–5.1)
PRO-BNP: 1955 PG/ML (ref 0–449)
PROCALCITONIN: 0.1 NG/ML (ref 0–0.15)
PROTEIN UA: NEGATIVE MG/DL
RBC # BLD: 3.74 M/UL (ref 4.2–5.9)
RBC UA: ABNORMAL /HPF (ref 0–4)
SARS-COV-2, NAAT: NOT DETECTED
SODIUM BLD-SCNC: 134 MMOL/L (ref 136–145)
SPECIFIC GRAVITY UA: 1.01 (ref 1–1.03)
TOTAL PROTEIN: 7.5 G/DL (ref 6.4–8.2)
TROPONIN: 0.01 NG/ML
URINE REFLEX TO CULTURE: YES
URINE TYPE: ABNORMAL
UROBILINOGEN, URINE: 0.2 E.U./DL
WBC # BLD: 15.9 K/UL (ref 4–11)
WBC UA: ABNORMAL /HPF (ref 0–5)

## 2021-04-09 PROCEDURE — 81001 URINALYSIS AUTO W/SCOPE: CPT

## 2021-04-09 PROCEDURE — 84145 PROCALCITONIN (PCT): CPT

## 2021-04-09 PROCEDURE — 83605 ASSAY OF LACTIC ACID: CPT

## 2021-04-09 PROCEDURE — 84484 ASSAY OF TROPONIN QUANT: CPT

## 2021-04-09 PROCEDURE — 71045 X-RAY EXAM CHEST 1 VIEW: CPT

## 2021-04-09 PROCEDURE — 99285 EMERGENCY DEPT VISIT HI MDM: CPT

## 2021-04-09 PROCEDURE — 93005 ELECTROCARDIOGRAM TRACING: CPT | Performed by: EMERGENCY MEDICINE

## 2021-04-09 PROCEDURE — 87186 SC STD MICRODIL/AGAR DIL: CPT

## 2021-04-09 PROCEDURE — 87635 SARS-COV-2 COVID-19 AMP PRB: CPT

## 2021-04-09 PROCEDURE — 93010 ELECTROCARDIOGRAM REPORT: CPT | Performed by: INTERNAL MEDICINE

## 2021-04-09 PROCEDURE — 6360000002 HC RX W HCPCS: Performed by: EMERGENCY MEDICINE

## 2021-04-09 PROCEDURE — 87040 BLOOD CULTURE FOR BACTERIA: CPT

## 2021-04-09 PROCEDURE — 96365 THER/PROPH/DIAG IV INF INIT: CPT

## 2021-04-09 PROCEDURE — 85025 COMPLETE CBC W/AUTO DIFF WBC: CPT

## 2021-04-09 PROCEDURE — 87086 URINE CULTURE/COLONY COUNT: CPT

## 2021-04-09 PROCEDURE — 80053 COMPREHEN METABOLIC PANEL: CPT

## 2021-04-09 PROCEDURE — 83880 ASSAY OF NATRIURETIC PEPTIDE: CPT

## 2021-04-09 PROCEDURE — 96375 TX/PRO/DX INJ NEW DRUG ADDON: CPT

## 2021-04-09 PROCEDURE — 87088 URINE BACTERIA CULTURE: CPT

## 2021-04-09 PROCEDURE — 2580000003 HC RX 258: Performed by: EMERGENCY MEDICINE

## 2021-04-09 RX ORDER — FUROSEMIDE 10 MG/ML
20 INJECTION INTRAMUSCULAR; INTRAVENOUS ONCE
Status: COMPLETED | OUTPATIENT
Start: 2021-04-09 | End: 2021-04-09

## 2021-04-09 RX ADMIN — FUROSEMIDE 20 MG: 10 INJECTION, SOLUTION INTRAMUSCULAR; INTRAVENOUS at 18:05

## 2021-04-09 RX ADMIN — CEFEPIME HYDROCHLORIDE 2000 MG: 2 INJECTION, POWDER, FOR SOLUTION INTRAVENOUS at 15:38

## 2021-04-09 NOTE — ED NOTES
Bed: 03  Expected date: 4/9/21  Expected time: 1:00 PM  Means of arrival:   Comments:  [facility] [address], RN  04/09/21 0578

## 2021-04-10 NOTE — ED NOTES
Attempting to give report to [facility] for 4th time. RN called main number and had  transfer to unit. \"Porsche\",  answered.       Cordelia Ly, RN  04/09/21 9333

## 2021-04-10 NOTE — ED NOTES
14[facility] for [address] [facility] @ 3008  Re: UTI, CHF, weakness  1818- dr. Osei Pi accepted pt  [facility]  Kenya Monique got [address] [facility] forms @ [facility]  Faxed paper work back to [address] [facility] @ 2020   Room number was given  [address] [place] [address] , report number is [id number]     Transport with [facility] @ 2130     [facility]  04/09/21 2037

## 2021-04-10 NOTE — ED NOTES
Attempted to call report to [facility] at [phone]. No answer. Phone rang so long without answer, the call auto disconnected. RN called back a second time and phone rang without answer again. Will attempt again in 15 minutes.       Heydi Glover RN  04/09/21 2322

## 2021-04-10 NOTE — ED PROVIDER NOTES
CHIEF COMPLAINT  Fever (medics called for \"sick person\" pt has fever 104 by medics. bs 145   pt + wheezing pt on home oxygen 2 liters. tylenol given pta  approx 1240. hx dementia.  lives at home. pt has DNR comfort care.  )      HISTORY OF PRESENT ILLNESS  Ben Eagle is a 80 y.o. male with a history of COPD, hypertension, recurrent UTI, dementia, atrial fibrillation, [address] status who presents to the ED complaining of multiple complaints. Patient reports feeling unwell for the past few days but significantly worse today. Feels generally weak such that he is unable to ambulate according to his wife who supplements the history due to patient's underlying dementia. Febrile at home reported to be 104 prior to receiving Tylenol. Wife also reports wheezing and dyspnea as well as dark malodorous urine suspicious for UTI. States over the past week he has demonstrated increased pedal edema and she is concerned about fluid retention. No report of sick contacts, productive cough, nausea, vomiting, diarrhea. Patient denies chest pain or abdominal pain. No other complaints, modifying factors or associated symptoms. I have reviewed the following from the nursing documentation. Past Medical History:   Diagnosis Date    Alzheimer disease     Atrial fibrillation ([facility]     Bronchitis chronic     Chronic urinary tract infection     Emphysema of lung ([facility]     Hypertension     Pneumonia     Urinary retention      No past surgical history on file.   Family History   Problem Relation Age of Onset    Cancer Mother         colon    Emphysema Father     Asthma Father      Social History     Socioeconomic History    Marital status:      Spouse name: Not on file    Number of children: Not on file    Years of education: Not on file    Highest education level: Not on file   Occupational History    Not on file   Social Needs    Financial resource strain: Not on file    Food insecurity Worry: Not on file     Inability: Not on file    Transportation needs     Medical: Not on file     Non-medical: Not on file   Tobacco Use    Smoking status: Former Smoker     Packs/day: 2.00     Years: 24.00     Pack years: 48.00     Types: Cigarettes     Quit date: 1982     Years since quittin.2    Smokeless tobacco: Never Used   Substance and Sexual Activity    Alcohol use: No    Drug use: Not Currently    Sexual activity: Not Currently   Lifestyle    Physical activity     Days per week: Not on file     Minutes per session: Not on file    Stress: Not on file   Relationships    Social connections     Talks on phone: Not on file     Gets together: Not on file     Attends Mandaen service: Not on file     Active member of club or organization: Not on file     Attends meetings of clubs or organizations: Not on file     Relationship status: Not on file    Intimate partner violence     Fear of current or ex partner: Not on file     Emotionally abused: Not on file     Physically abused: Not on file     Forced sexual activity: Not on file   Other Topics Concern    Not on file   Social History Narrative    Not on file     No current facility-administered medications for this encounter. Current Outpatient Medications   Medication Sig Dispense Refill    apixaban (ELIQUIS) 5 MG TABS tablet Take 5 mg by mouth 2 times daily      LORazepam (ATIVAN) 0.5 MG tablet Take 0.5 mg by mouth every 12 hours.       fluticasone-vilanterol (BREO ELLIPTA) 100-25 MCG/INH AEPB inhaler Inhale 2 puffs into the lungs 2 times daily      tiotropium (SPIRIVA) 18 MCG inhalation capsule Inhale 18 mcg into the lungs daily      ciprofloxacin (CIPRO) 500 MG tablet Take 500 mg by mouth 2 times daily      lisinopril (PRINIVIL;ZESTRIL) 20 MG tablet Take 20 mg by mouth daily      melatonin 3 MG TABS tablet Take 9 mg by mouth daily      metoprolol tartrate (LOPRESSOR) 25 MG tablet Take 12.5 mg by mouth 2 times daily      Skin Protectants, Misc. (EUCERIN) cream Apply topically as needed for Dry Skin Apply topically as needed.  tamsulosin (FLOMAX) 0.4 MG capsule Take 1 capsule by mouth nightly (Patient taking differently: Take 0.8 mg by mouth nightly ) 30 capsule 3    Cholecalciferol (VITAMIN D) 2000 units CAPS capsule Take 2,000 Units by mouth daily       calcium carbonate (OSCAL) 500 MG TABS tablet Take 500 mg by mouth daily      pravastatin (PRAVACHOL) 40 MG tablet Take 20 mg by mouth daily       albuterol (PROVENTIL HFA;VENTOLIN HFA) 108 (90 BASE) MCG/ACT inhaler Inhale 2 puffs into the lungs every 6 hours as needed.  docusate sodium (COLACE) 100 MG capsule Take 100 mg by mouth 2 times daily. No Known Allergies    REVIEW OF SYSTEMS  10 systems reviewed, pertinent positives per HPI otherwise noted to be negative. PHYSICAL EXAM  /60   Pulse 85   Temp 98.8 °F (37.1 °C) (Oral)   Resp 28   Ht 5' 10\" (1.778 m)   Wt 190 lb (86.2 kg)   SpO2 96%   BMI 27.26 kg/m²   GENERAL APPEARANCE: Awake and alert. Cooperative. Appears elderly, frail, ill but nontoxic. HEAD: Normocephalic. Atraumatic. EYES: PERRL. EOM's grossly intact. ENT: Mucous membranes are moist.   NECK: Supple, trachea midline. HEART: Irregularly irregular. Normal S1S2, no rubs, gallops, or murmurs noted  LUNGS: Appears mildly dyspneic with mild tachypnea. Bibasilar rales and faint scattered expiratory wheezing. Speaking comfortably in full sentences. ABDOMEN: Soft. Non-distended. Non-tender. No guarding or rebound. Normal bowel sounds. EXTREMITIES: 1+ bilateral pitting edema. MAEE. No acute deformities. SKIN: Warm and dry. No acute rashes. NEUROLOGICAL: Alert and interactive, baseline dementia. CN II-XII intact. No gross facial drooping. Strength 5/5, sensation intact. Normal coordination. No pronator drift. No truncal instability. PSYCHIATRIC: Normal mood and affect. LABS  I have reviewed all labs for this visit.    Results for orders placed or performed during the hospital encounter of 04/09/21   COVID-19, Rapid    Specimen: Nasopharyngeal Swab   Result Value Ref Range    SARS-CoV-2, NAAT Not Detected Not Detected   CBC auto differential   Result Value Ref Range    WBC 15.9 (H) 4.0 - 11.0 K/uL    RBC 3.74 (L) 4.20 - 5.90 M/uL    Hemoglobin 11.2 (L) 13.5 - 17.5 g/dL    Hematocrit 34.3 (L) 40.5 - 52.5 %    MCV 91.6 80.0 - 100.0 fL    MCH 29.9 26.0 - 34.0 pg    MCHC 32.6 31.0 - 36.0 g/dL    RDW 14.7 12.4 - 15.4 %    Platelets 072 996 - 573 K/uL    MPV 8.2 5.0 - 10.5 fL    Neutrophils % 82.8 %    Lymphocytes % 6.8 %    Monocytes % 9.8 %    Eosinophils % 0.2 %    Basophils % 0.4 %    Neutrophils Absolute 13.2 (H) 1.7 - 7.7 K/uL    Lymphocytes Absolute 1.1 1.0 - 5.1 K/uL    Monocytes Absolute 1.6 (H) 0.0 - 1.3 K/uL    Eosinophils Absolute 0.0 0.0 - 0.6 K/uL    Basophils Absolute 0.1 0.0 - 0.2 K/uL   Comprehensive metabolic panel   Result Value Ref Range    Sodium 134 (L) 136 - 145 mmol/L    Potassium 4.5 3.5 - 5.1 mmol/L    Chloride 96 (L) 99 - 110 mmol/L    CO2 28 21 - 32 mmol/L    Anion Gap 10 3 - 16    Glucose 117 (H) 70 - 99 mg/dL    BUN 15 7 - 20 mg/dL    CREATININE 0.7 (L) 0.8 - 1.3 mg/dL    GFR Non-African American >60 >60    GFR African American >60 >60    Calcium 9.9 8.3 - 10.6 mg/dL    Total Protein 7.5 6.4 - 8.2 g/dL    Albumin 4.1 3.4 - 5.0 g/dL    Albumin/Globulin Ratio 1.2 1.1 - 2.2    Total Bilirubin 0.9 0.0 - 1.0 mg/dL    Alkaline Phosphatase 104 40 - 129 U/L    ALT 28 10 - 40 U/L    AST 23 15 - 37 U/L    Globulin 3.4 g/dL   Lactic acid, plasma   Result Value Ref Range    Lactic Acid 1.5 0.4 - 2.0 mmol/L   Urine, reflex to culture    Specimen: Urine, clean catch   Result Value Ref Range    Color, UA Yellow Straw/Yellow    Clarity, UA SL CLOUDY (A) Clear    Glucose, Ur Negative Negative mg/dL    Bilirubin Urine Negative Negative    Ketones, Urine Negative Negative mg/dL    Specific Gravity, UA 1.015 1.005 - 1.030    Blood, Urine TRACE-LYSED (A) Negative    pH, UA 7.0 5.0 - 8.0    Protein, UA Negative Negative mg/dL    Urobilinogen, Urine 0.2 <2.0 E.U./dL    Nitrite, Urine POSITIVE (A) Negative    Leukocyte Esterase, Urine LARGE (A) Negative    Microscopic Examination YES     Urine Type NotGiven     Urine Reflex to Culture Yes    Microscopic Urinalysis   Result Value Ref Range    WBC, UA 21-50 (A) 0 - 5 /HPF    RBC, UA None seen 0 - 4 /HPF    Epithelial Cells, UA 0-1 0 - 5 /HPF    Bacteria, UA 2+ (A) None Seen /HPF   Brain Natriuretic Peptide   Result Value Ref Range    Pro-BNP 1,955 (H) 0 - 449 pg/mL   Troponin   Result Value Ref Range    Troponin 0.01 <0.01 ng/mL   Procalcitonin   Result Value Ref Range    Procalcitonin 0.10 0.00 - 0.15 ng/mL   EKG 12 Lead   Result Value Ref Range    Ventricular Rate 83 BPM    Atrial Rate 227 BPM    QRS Duration 82 ms    Q-T Interval 380 ms    QTc Calculation (Bazett) 446 ms    R Axis 38 degrees    T Axis 93 degrees    Diagnosis       Atrial fibrillationST & T wave abnormality, consider lateral ischemia or digitalis effectAbnormal ECGWhen compared with ECG of 11-AUG-2019 10:45,T wave inversion now evident in Lateral leadsConfirmed by BEATRIS WOODY, [address] ([id number]) on 4/9/2021 8:50:19 PM       EKG  Atrial fibrillation, rate 83, normal axis, QTC within normal limits, no acute ST changes or T wave inversions. RADIOLOGY  X-RAYS:  I have reviewed radiologic plain film image(s). ALL OTHER NON-PLAIN FILM IMAGES SUCH AS CT, ULTRASOUND AND MRI HAVE BEEN READ BY THE RADIOLOGIST. XR CHEST PORTABLE   Final Result   No acute cardiopulmonary disease. Mild cardiomegaly without overt failure. Increased interstitial markings, likely chronic and stable. Rechecks: Physical assessment performed. Appears unchanged on reevaluation. Elderly, frail, ill but nontoxic. ED COURSE/MDM  Patient seen and evaluated. Old records reviewed. Labs and imaging reviewed and results discussed with patient.      Patient febrile and generally weak such that he is unable to safely ambulate. Found to have acute on chronic UTI. Also with shortness of breath and wheezing. Work-up suggestive of CHF and COPD exacerbation. Patient requests transfer to the [place] where his physicians are located. Case was discussed with Dr. Jack Pedro who has agreed to accept him. New Prescriptions    No medications on file       CLINICAL IMPRESSION  1. Acute UTI    2. Acute on chronic congestive heart failure, unspecified heart failure type ([address]    3. Generalized weakness        Blood pressure 104/60, pulse 85, temperature 98.8 °F (37.1 °C), temperature source Oral, resp. rate 28, height 5' 10\" (1.778 m), weight 190 lb (86.2 kg), SpO2 96 %. DISPOSITION  Brett Delgado was transferred in stable condition.         Emory Siddiqi MD  04/09/21 8748

## 2021-04-11 LAB
ORGANISM: ABNORMAL
URINE CULTURE, ROUTINE: ABNORMAL

## 2021-04-12 NOTE — RESULT ENCOUNTER NOTE
Culture reviewed, patient has been transferred to the [facility], please update on the results of the urine culture

## 2021-04-13 LAB
BLOOD CULTURE, ROUTINE: NORMAL
CULTURE, BLOOD 2: NORMAL

## 2021-10-09 ENCOUNTER — APPOINTMENT (OUTPATIENT)
Dept: GENERAL RADIOLOGY | Age: 86
DRG: 177 | End: 2021-10-09
Payer: OTHER GOVERNMENT

## 2021-10-09 ENCOUNTER — HOSPITAL ENCOUNTER (INPATIENT)
Age: 86
LOS: 5 days | Discharge: HOME HEALTH | DRG: 177 | End: 2021-10-14
Attending: EMERGENCY MEDICINE | Admitting: STUDENT IN AN ORGANIZED HEALTH CARE EDUCATION/TRAINING PROGRAM
Payer: OTHER GOVERNMENT

## 2021-10-09 ENCOUNTER — APPOINTMENT (OUTPATIENT)
Dept: CT IMAGING | Age: 86
DRG: 177 | End: 2021-10-09
Payer: OTHER GOVERNMENT

## 2021-10-09 DIAGNOSIS — I48.91 ATRIAL FIBRILLATION, UNSPECIFIED TYPE (HCC): ICD-10-CM

## 2021-10-09 DIAGNOSIS — U07.1 COVID-19: ICD-10-CM

## 2021-10-09 DIAGNOSIS — R09.02 HYPOXIA: Primary | ICD-10-CM

## 2021-10-09 PROBLEM — J12.82 PNEUMONIA DUE TO COVID-19 VIRUS: Status: ACTIVE | Noted: 2021-10-09

## 2021-10-09 PROBLEM — J96.01 ACUTE HYPOXEMIC RESPIRATORY FAILURE DUE TO COVID-19 (HCC): Status: ACTIVE | Noted: 2021-10-09

## 2021-10-09 LAB
A/G RATIO: 1.1 (ref 1.1–2.2)
ALBUMIN SERPL-MCNC: 3.6 G/DL (ref 3.4–5)
ALP BLD-CCNC: 118 U/L (ref 40–129)
ALT SERPL-CCNC: 60 U/L (ref 10–40)
ANION GAP SERPL CALCULATED.3IONS-SCNC: 11 MMOL/L (ref 3–16)
AST SERPL-CCNC: 49 U/L (ref 15–37)
BASE EXCESS VENOUS: 4.2 MMOL/L (ref -3–3)
BASOPHILS ABSOLUTE: 0 K/UL (ref 0–0.2)
BASOPHILS RELATIVE PERCENT: 0.3 %
BILIRUB SERPL-MCNC: 0.6 MG/DL (ref 0–1)
BUN BLDV-MCNC: 22 MG/DL (ref 7–20)
C-REACTIVE PROTEIN: 34.4 MG/L (ref 0–5.1)
CALCIUM SERPL-MCNC: 8.8 MG/DL (ref 8.3–10.6)
CARBOXYHEMOGLOBIN: 2.2 % (ref 0–1.5)
CHLORIDE BLD-SCNC: 96 MMOL/L (ref 99–110)
CO2: 27 MMOL/L (ref 21–32)
CREAT SERPL-MCNC: 0.7 MG/DL (ref 0.8–1.3)
EKG ATRIAL RATE: 119 BPM
EKG DIAGNOSIS: NORMAL
EKG Q-T INTERVAL: 346 MS
EKG QRS DURATION: 88 MS
EKG QTC CALCULATION (BAZETT): 459 MS
EKG R AXIS: 55 DEGREES
EKG T AXIS: 10 DEGREES
EKG VENTRICULAR RATE: 106 BPM
EOSINOPHILS ABSOLUTE: 0 K/UL (ref 0–0.6)
EOSINOPHILS RELATIVE PERCENT: 0.1 %
FERRITIN: 2506 NG/ML (ref 30–400)
GFR AFRICAN AMERICAN: >60
GFR NON-AFRICAN AMERICAN: >60
GLOBULIN: 3.3 G/DL
GLUCOSE BLD-MCNC: 145 MG/DL (ref 70–99)
HCO3 VENOUS: 27.9 MMOL/L (ref 23–29)
HCT VFR BLD CALC: 30.7 % (ref 40.5–52.5)
HEMOGLOBIN: 10.3 G/DL (ref 13.5–17.5)
LACTATE DEHYDROGENASE: 228 U/L (ref 100–190)
LACTIC ACID, SEPSIS: 1.1 MMOL/L (ref 0.4–1.9)
LACTIC ACID, SEPSIS: 1.2 MMOL/L (ref 0.4–1.9)
LYMPHOCYTES ABSOLUTE: 0.4 K/UL (ref 1–5.1)
LYMPHOCYTES RELATIVE PERCENT: 3.6 %
MAGNESIUM: 1.6 MG/DL (ref 1.8–2.4)
MCH RBC QN AUTO: 29.8 PG (ref 26–34)
MCHC RBC AUTO-ENTMCNC: 33.5 G/DL (ref 31–36)
MCV RBC AUTO: 88.9 FL (ref 80–100)
METHEMOGLOBIN VENOUS: 0.5 %
MONOCYTES ABSOLUTE: 0.7 K/UL (ref 0–1.3)
MONOCYTES RELATIVE PERCENT: 7.4 %
NEUTROPHILS ABSOLUTE: 8.7 K/UL (ref 1.7–7.7)
NEUTROPHILS RELATIVE PERCENT: 88.6 %
O2 SAT, VEN: 99 %
O2 THERAPY: ABNORMAL
PCO2, VEN: 38.1 MMHG (ref 40–50)
PDW BLD-RTO: 15.1 % (ref 12.4–15.4)
PH VENOUS: 7.48 (ref 7.35–7.45)
PLATELET # BLD: 162 K/UL (ref 135–450)
PMV BLD AUTO: 8.2 FL (ref 5–10.5)
PO2, VEN: 153.2 MMHG (ref 25–40)
POTASSIUM REFLEX MAGNESIUM: 3.3 MMOL/L (ref 3.5–5.1)
PRO-BNP: 2822 PG/ML (ref 0–449)
RBC # BLD: 3.46 M/UL (ref 4.2–5.9)
REASON FOR REJECTION: NORMAL
REJECTED TEST: NORMAL
SODIUM BLD-SCNC: 134 MMOL/L (ref 136–145)
SPECIMEN STATUS: NORMAL
TCO2 CALC VENOUS: 29 MMOL/L
TOTAL PROTEIN: 6.9 G/DL (ref 6.4–8.2)
TROPONIN: 0.04 NG/ML
WBC # BLD: 9.8 K/UL (ref 4–11)

## 2021-10-09 PROCEDURE — 6360000002 HC RX W HCPCS: Performed by: EMERGENCY MEDICINE

## 2021-10-09 PROCEDURE — 96365 THER/PROPH/DIAG IV INF INIT: CPT

## 2021-10-09 PROCEDURE — 85025 COMPLETE CBC W/AUTO DIFF WBC: CPT

## 2021-10-09 PROCEDURE — 99285 EMERGENCY DEPT VISIT HI MDM: CPT

## 2021-10-09 PROCEDURE — 80053 COMPREHEN METABOLIC PANEL: CPT

## 2021-10-09 PROCEDURE — 82728 ASSAY OF FERRITIN: CPT

## 2021-10-09 PROCEDURE — XW033E5 INTRODUCTION OF REMDESIVIR ANTI-INFECTIVE INTO PERIPHERAL VEIN, PERCUTANEOUS APPROACH, NEW TECHNOLOGY GROUP 5: ICD-10-PCS | Performed by: STUDENT IN AN ORGANIZED HEALTH CARE EDUCATION/TRAINING PROGRAM

## 2021-10-09 PROCEDURE — 83605 ASSAY OF LACTIC ACID: CPT

## 2021-10-09 PROCEDURE — 6360000004 HC RX CONTRAST MEDICATION: Performed by: EMERGENCY MEDICINE

## 2021-10-09 PROCEDURE — 2060000000 HC ICU INTERMEDIATE R&B

## 2021-10-09 PROCEDURE — 83880 ASSAY OF NATRIURETIC PEPTIDE: CPT

## 2021-10-09 PROCEDURE — 83615 LACTATE (LD) (LDH) ENZYME: CPT

## 2021-10-09 PROCEDURE — 2580000003 HC RX 258: Performed by: STUDENT IN AN ORGANIZED HEALTH CARE EDUCATION/TRAINING PROGRAM

## 2021-10-09 PROCEDURE — 2700000000 HC OXYGEN THERAPY PER DAY

## 2021-10-09 PROCEDURE — 93010 ELECTROCARDIOGRAM REPORT: CPT | Performed by: INTERNAL MEDICINE

## 2021-10-09 PROCEDURE — 71260 CT THORAX DX C+: CPT

## 2021-10-09 PROCEDURE — 94640 AIRWAY INHALATION TREATMENT: CPT

## 2021-10-09 PROCEDURE — 6370000000 HC RX 637 (ALT 250 FOR IP): Performed by: STUDENT IN AN ORGANIZED HEALTH CARE EDUCATION/TRAINING PROGRAM

## 2021-10-09 PROCEDURE — 6360000002 HC RX W HCPCS: Performed by: INTERNAL MEDICINE

## 2021-10-09 PROCEDURE — 6370000000 HC RX 637 (ALT 250 FOR IP): Performed by: INTERNAL MEDICINE

## 2021-10-09 PROCEDURE — 96375 TX/PRO/DX INJ NEW DRUG ADDON: CPT

## 2021-10-09 PROCEDURE — 94761 N-INVAS EAR/PLS OXIMETRY MLT: CPT

## 2021-10-09 PROCEDURE — 36415 COLL VENOUS BLD VENIPUNCTURE: CPT

## 2021-10-09 PROCEDURE — 2580000003 HC RX 258: Performed by: INTERNAL MEDICINE

## 2021-10-09 PROCEDURE — 96366 THER/PROPH/DIAG IV INF ADDON: CPT

## 2021-10-09 PROCEDURE — 82803 BLOOD GASES ANY COMBINATION: CPT

## 2021-10-09 PROCEDURE — 71045 X-RAY EXAM CHEST 1 VIEW: CPT

## 2021-10-09 PROCEDURE — 93005 ELECTROCARDIOGRAM TRACING: CPT | Performed by: EMERGENCY MEDICINE

## 2021-10-09 PROCEDURE — 86140 C-REACTIVE PROTEIN: CPT

## 2021-10-09 PROCEDURE — 84484 ASSAY OF TROPONIN QUANT: CPT

## 2021-10-09 PROCEDURE — 83735 ASSAY OF MAGNESIUM: CPT

## 2021-10-09 PROCEDURE — 2500000003 HC RX 250 WO HCPCS: Performed by: STUDENT IN AN ORGANIZED HEALTH CARE EDUCATION/TRAINING PROGRAM

## 2021-10-09 RX ORDER — VITAMIN B COMPLEX
2000 TABLET ORAL DAILY
Status: DISCONTINUED | OUTPATIENT
Start: 2021-10-09 | End: 2021-10-14 | Stop reason: HOSPADM

## 2021-10-09 RX ORDER — BUDESONIDE AND FORMOTEROL FUMARATE DIHYDRATE 80; 4.5 UG/1; UG/1
2 AEROSOL RESPIRATORY (INHALATION) 2 TIMES DAILY
Status: DISCONTINUED | OUTPATIENT
Start: 2021-10-09 | End: 2021-10-14 | Stop reason: HOSPADM

## 2021-10-09 RX ORDER — POTASSIUM CHLORIDE 7.45 MG/ML
10 INJECTION INTRAVENOUS
Status: DISCONTINUED | OUTPATIENT
Start: 2021-10-09 | End: 2021-10-09

## 2021-10-09 RX ORDER — ONDANSETRON 4 MG/1
4 TABLET, ORALLY DISINTEGRATING ORAL EVERY 8 HOURS PRN
Status: DISCONTINUED | OUTPATIENT
Start: 2021-10-09 | End: 2021-10-14 | Stop reason: HOSPADM

## 2021-10-09 RX ORDER — SODIUM CHLORIDE 0.9 % (FLUSH) 0.9 %
5-40 SYRINGE (ML) INJECTION PRN
Status: DISCONTINUED | OUTPATIENT
Start: 2021-10-09 | End: 2021-10-14 | Stop reason: HOSPADM

## 2021-10-09 RX ORDER — ACETAMINOPHEN 650 MG/1
650 SUPPOSITORY RECTAL EVERY 6 HOURS PRN
Status: DISCONTINUED | OUTPATIENT
Start: 2021-10-09 | End: 2021-10-14 | Stop reason: HOSPADM

## 2021-10-09 RX ORDER — MAGNESIUM SULFATE IN WATER 40 MG/ML
2000 INJECTION, SOLUTION INTRAVENOUS ONCE
Status: COMPLETED | OUTPATIENT
Start: 2021-10-09 | End: 2021-10-09

## 2021-10-09 RX ORDER — POTASSIUM CHLORIDE 7.45 MG/ML
10 INJECTION INTRAVENOUS
Status: COMPLETED | OUTPATIENT
Start: 2021-10-09 | End: 2021-10-09

## 2021-10-09 RX ORDER — LORAZEPAM 0.5 MG/1
0.5 TABLET ORAL EVERY 12 HOURS
Status: DISCONTINUED | OUTPATIENT
Start: 2021-10-09 | End: 2021-10-09

## 2021-10-09 RX ORDER — SODIUM CHLORIDE 0.9 % (FLUSH) 0.9 %
5-40 SYRINGE (ML) INJECTION EVERY 12 HOURS SCHEDULED
Status: DISCONTINUED | OUTPATIENT
Start: 2021-10-09 | End: 2021-10-14 | Stop reason: HOSPADM

## 2021-10-09 RX ORDER — POLYETHYLENE GLYCOL 3350 17 G/17G
17 POWDER, FOR SOLUTION ORAL DAILY PRN
Status: DISCONTINUED | OUTPATIENT
Start: 2021-10-09 | End: 2021-10-14 | Stop reason: HOSPADM

## 2021-10-09 RX ORDER — GUAIFENESIN/DEXTROMETHORPHAN 100-10MG/5
5 SYRUP ORAL EVERY 4 HOURS PRN
Status: DISCONTINUED | OUTPATIENT
Start: 2021-10-09 | End: 2021-10-14 | Stop reason: HOSPADM

## 2021-10-09 RX ORDER — OLANZAPINE 5 MG/1
2.5 TABLET, ORALLY DISINTEGRATING ORAL NIGHTLY PRN
Status: DISCONTINUED | OUTPATIENT
Start: 2021-10-09 | End: 2021-10-14 | Stop reason: HOSPADM

## 2021-10-09 RX ORDER — ALBUTEROL SULFATE 90 UG/1
2 AEROSOL, METERED RESPIRATORY (INHALATION)
Status: DISCONTINUED | OUTPATIENT
Start: 2021-10-09 | End: 2021-10-14 | Stop reason: HOSPADM

## 2021-10-09 RX ORDER — IPRATROPIUM BROMIDE AND ALBUTEROL SULFATE 2.5; .5 MG/3ML; MG/3ML
1 SOLUTION RESPIRATORY (INHALATION) EVERY 4 HOURS PRN
Status: DISCONTINUED | OUTPATIENT
Start: 2021-10-09 | End: 2021-10-14 | Stop reason: HOSPADM

## 2021-10-09 RX ORDER — 0.9 % SODIUM CHLORIDE 0.9 %
30 INTRAVENOUS SOLUTION INTRAVENOUS PRN
Status: DISCONTINUED | OUTPATIENT
Start: 2021-10-09 | End: 2021-10-14 | Stop reason: HOSPADM

## 2021-10-09 RX ORDER — MAGNESIUM SULFATE IN WATER 40 MG/ML
2000 INJECTION, SOLUTION INTRAVENOUS ONCE
Status: DISCONTINUED | OUTPATIENT
Start: 2021-10-09 | End: 2021-10-09

## 2021-10-09 RX ORDER — ALBUTEROL SULFATE 90 UG/1
2 AEROSOL, METERED RESPIRATORY (INHALATION) EVERY 6 HOURS PRN
Status: DISCONTINUED | OUTPATIENT
Start: 2021-10-09 | End: 2021-10-14 | Stop reason: HOSPADM

## 2021-10-09 RX ORDER — DEXAMETHASONE SODIUM PHOSPHATE 10 MG/ML
10 INJECTION INTRAMUSCULAR; INTRAVENOUS ONCE
Status: COMPLETED | OUTPATIENT
Start: 2021-10-09 | End: 2021-10-09

## 2021-10-09 RX ORDER — MULTIVIT-MIN/IRON/FOLIC ACID/K 18-600-40
2000 CAPSULE ORAL DAILY
Status: DISCONTINUED | OUTPATIENT
Start: 2021-10-09 | End: 2021-10-09

## 2021-10-09 RX ORDER — FINASTERIDE 5 MG/1
5 TABLET, FILM COATED ORAL DAILY
Status: DISCONTINUED | OUTPATIENT
Start: 2021-10-09 | End: 2021-10-14 | Stop reason: HOSPADM

## 2021-10-09 RX ORDER — MECOBALAMIN 5000 MCG
5 TABLET,DISINTEGRATING ORAL NIGHTLY
Status: DISCONTINUED | OUTPATIENT
Start: 2021-10-09 | End: 2021-10-14 | Stop reason: HOSPADM

## 2021-10-09 RX ORDER — TAMSULOSIN HYDROCHLORIDE 0.4 MG/1
0.4 CAPSULE ORAL NIGHTLY
Status: DISCONTINUED | OUTPATIENT
Start: 2021-10-09 | End: 2021-10-14 | Stop reason: HOSPADM

## 2021-10-09 RX ORDER — DEXAMETHASONE SODIUM PHOSPHATE 10 MG/ML
10 INJECTION INTRAMUSCULAR; INTRAVENOUS ONCE
Status: DISCONTINUED | OUTPATIENT
Start: 2021-10-09 | End: 2021-10-09

## 2021-10-09 RX ORDER — METHYLPREDNISOLONE SODIUM SUCCINATE 40 MG/ML
40 INJECTION, POWDER, LYOPHILIZED, FOR SOLUTION INTRAMUSCULAR; INTRAVENOUS EVERY 12 HOURS
Status: DISCONTINUED | OUTPATIENT
Start: 2021-10-09 | End: 2021-10-14

## 2021-10-09 RX ORDER — PRAVASTATIN SODIUM 20 MG
20 TABLET ORAL DAILY
Status: DISCONTINUED | OUTPATIENT
Start: 2021-10-09 | End: 2021-10-14 | Stop reason: HOSPADM

## 2021-10-09 RX ORDER — ACETAMINOPHEN 325 MG/1
650 TABLET ORAL EVERY 6 HOURS PRN
Status: DISCONTINUED | OUTPATIENT
Start: 2021-10-09 | End: 2021-10-14 | Stop reason: HOSPADM

## 2021-10-09 RX ORDER — IPRATROPIUM BROMIDE AND ALBUTEROL SULFATE 2.5; .5 MG/3ML; MG/3ML
1 SOLUTION RESPIRATORY (INHALATION)
Status: DISCONTINUED | OUTPATIENT
Start: 2021-10-09 | End: 2021-10-09

## 2021-10-09 RX ORDER — SODIUM CHLORIDE 9 MG/ML
25 INJECTION, SOLUTION INTRAVENOUS PRN
Status: DISCONTINUED | OUTPATIENT
Start: 2021-10-09 | End: 2021-10-14 | Stop reason: HOSPADM

## 2021-10-09 RX ORDER — ONDANSETRON 2 MG/ML
4 INJECTION INTRAMUSCULAR; INTRAVENOUS EVERY 6 HOURS PRN
Status: DISCONTINUED | OUTPATIENT
Start: 2021-10-09 | End: 2021-10-14 | Stop reason: HOSPADM

## 2021-10-09 RX ADMIN — Medication 2 PUFF: at 16:55

## 2021-10-09 RX ADMIN — IOPAMIDOL 75 ML: 755 INJECTION, SOLUTION INTRAVENOUS at 06:03

## 2021-10-09 RX ADMIN — APIXABAN 5 MG: 5 TABLET, FILM COATED ORAL at 14:47

## 2021-10-09 RX ADMIN — FINASTERIDE 5 MG: 5 TABLET, FILM COATED ORAL at 14:47

## 2021-10-09 RX ADMIN — REMDESIVIR 200 MG: 100 INJECTION, POWDER, LYOPHILIZED, FOR SOLUTION INTRAVENOUS at 15:00

## 2021-10-09 RX ADMIN — MAGNESIUM SULFATE IN WATER 2000 MG: 40 INJECTION, SOLUTION INTRAVENOUS at 07:24

## 2021-10-09 RX ADMIN — SODIUM CHLORIDE, PRESERVATIVE FREE 10 ML: 5 INJECTION INTRAVENOUS at 14:50

## 2021-10-09 RX ADMIN — METHYLPREDNISOLONE SODIUM SUCCINATE 40 MG: 40 INJECTION, POWDER, FOR SOLUTION INTRAMUSCULAR; INTRAVENOUS at 14:48

## 2021-10-09 RX ADMIN — POTASSIUM CHLORIDE 10 MEQ: 7.46 INJECTION, SOLUTION INTRAVENOUS at 08:24

## 2021-10-09 RX ADMIN — POTASSIUM CHLORIDE 10 MEQ: 7.46 INJECTION, SOLUTION INTRAVENOUS at 07:21

## 2021-10-09 RX ADMIN — Medication 2 PUFF: at 16:54

## 2021-10-09 RX ADMIN — PRAVASTATIN SODIUM 20 MG: 20 TABLET ORAL at 14:47

## 2021-10-09 RX ADMIN — DEXAMETHASONE SODIUM PHOSPHATE 10 MG: 10 INJECTION INTRAMUSCULAR; INTRAVENOUS at 05:41

## 2021-10-09 RX ADMIN — POTASSIUM CHLORIDE 10 MEQ: 7.46 INJECTION, SOLUTION INTRAVENOUS at 09:34

## 2021-10-09 RX ADMIN — GUAIFENESIN AND DEXTROMETHORPHAN 5 ML: 100; 10 SYRUP ORAL at 15:02

## 2021-10-09 RX ADMIN — Medication 2 PUFF: at 19:29

## 2021-10-09 ASSESSMENT — PAIN SCALES - GENERAL: PAINLEVEL_OUTOF10: 0

## 2021-10-09 NOTE — ED NOTES
RN attempted to call C4 regarding patient being transferred to inpatient unit. No answer from floor staff.       Guillermina Apley, RN  10/09/21 0192

## 2021-10-09 NOTE — H&P
Hospital Medicine History & Physical      PCP: Genaro Acosta 8816    Date of Admission: 10/9/2021    Date of Service: Pt seen/examined on 10/09/2021 and Admitted to Inpatient with expected LOS greater than two midnights due to medical therapy. Chief Complaint:  sob      History Of Present Illness:    80 y.o. male who presented to [facility] with fever and cough  Pmhx: dementia parkinsonism features, COPD on 2L continuous, CHF, afib, htn,BPH straight cath    Unable to obtain history from pt due to clinical conditon. Below is history provided from wife. All family members (wife, son and daughter in law) tested positive for covid-19 on 10/06. Test obtained due to precautions as they had been around other family friends who were positive covid-19. His sxs started 10/08? But it is very unclear as pt has a chronic cough and wheezing. He then developed a fever 102-103 which was refractory to cool rags. This prompted the EMS call. He has a chronic cough and chronically on oxygen but has required increased oxygen at home 4-6L    At baseline he can talk and is understandable but does have episodes of incomphrensible speech, gets agitated easily, the pt feeds himself, needs assistance with dressing, does not cook, walks with a walker, fell on 09/28 of September due to malfunction of walker. Pt is DNR-CA      In the ED he was given dexamethasone 10 mg x1 as well as potassium and magnesium. VS notable for BP  with rr 25-36. Tachycardic 114. initially on 4L but then placed on vapotherm. Labs notable for Na 134, potasium 3.3, lactate 1.1, mag 1.6, , probnp 2822, trop 0.04, ALT/AST 60/49, hgb 10.3, MCV 88. CT chest no PE, multifocal infiltrates, with background of emphysematous changes, significant cardiac calcifications, CXR with multifocal infiltrates. EKF with afib no acute st changes,. Non specific ST changes in inferior leads.  VBG 7.4/38      Past Medical History:          Diagnosis Date    Alzheimer disease ([facility] [address]     Atrial fibrillation ([facility]     Bronchitis chronic     Chronic urinary tract infection     Emphysema of lung ([facility]     Hypertension     Pneumonia     Urinary retention        Past Surgical History:      History reviewed. No pertinent surgical history. Medications Prior to Admission:      Prior to Admission medications    Medication Sig Start Date End Date Taking? Authorizing Provider   apixaban (ELIQUIS) 5 MG TABS tablet Take 5 mg by mouth 2 times daily    Historical Provider, MD   LORazepam (ATIVAN) 0.5 MG tablet Take 0.5 mg by mouth every 12 hours. Historical Provider, MD   fluticasone-vilanterol (BREO ELLIPTA) 100-25 MCG/INH AEPB inhaler Inhale 2 puffs into the lungs 2 times daily    Historical Provider, MD   tiotropium (SPIRIVA) 18 MCG inhalation capsule Inhale 18 mcg into the lungs daily    Historical Provider, MD   ciprofloxacin (CIPRO) 500 MG tablet Take 500 mg by mouth 2 times daily    Historical Provider, MD   lisinopril (PRINIVIL;ZESTRIL) 20 MG tablet Take 20 mg by mouth daily    Historical Provider, MD   melatonin 3 MG TABS tablet Take 9 mg by mouth daily    Historical Provider, MD   metoprolol tartrate (LOPRESSOR) 25 MG tablet Take 12.5 mg by mouth 2 times daily    Historical Provider, MD   Skin Protectants, Misc. (EUCERIN) cream Apply topically as needed for Dry Skin Apply topically as needed.     Historical Provider, MD   tamsulosin (FLOMAX) 0.4 MG capsule Take 1 capsule by mouth nightly  Patient taking differently: Take 0.8 mg by mouth nightly  3/17/19   Mack Martínez MD   Cholecalciferol (VITAMIN D) 2000 units CAPS capsule Take 2,000 Units by mouth daily     Historical Provider, MD   calcium carbonate (OSCAL) 500 MG TABS tablet Take 500 mg by mouth daily    Historical Provider, MD   pravastatin (PRAVACHOL) 40 MG tablet Take 20 mg by mouth daily     Historical Provider, MD   albuterol (PROVENTIL HFA;VENTOLIN HFA) 108 (90 BASE) MCG/ACT inhaler Inhale 2 puffs into the lungs every 6 hours as needed. Historical Provider, MD   docusate sodium (COLACE) 100 MG capsule Take 100 mg by mouth 2 times daily. Historical Provider, MD       Allergies:  Patient has no known allergies. Social History:      The patient currently lives with wife    TOBACCO:   reports that he quit smoking about 39 years ago. His smoking use included cigarettes. He has a 48.00 pack-year smoking history. He has never used smokeless tobacco.  ETOH:   reports no history of alcohol use. E-Cigarettes/Vaping Use     Questions Responses    E-Cigarette/Vaping Use Never User    Start Date     Passive Exposure     Quit Date     Counseling Given     Comments             Family History:       Reviewed in detail and negative for DM, CAD, Cancer, CVA. Positive as follows:        Problem Relation Age of Onset    Cancer Mother         colon    Emphysema Father     Asthma Father        REVIEW OF SYSTEMS COMPLETED:   Pertinent positives as noted in the HPI. All other systems reviewed and negative. PHYSICAL EXAM PERFORMED:    BP 95/75   Pulse 79   Temp 99 °F (37.2 °C) (Axillary)   Resp 21   Ht 6' 1\" (1.854 m)   Wt 190 lb (86.2 kg)   SpO2 99%   BMI 25.07 kg/m²     General appearance: laying in bed comfortably, very drowsy, wakes up on commands but does not answer questions. At times shakes head yes or no  HEENT:  Normal cephalic, atraumatic without obvious deformity. Pupils equal, round, and reactive to light. Extra ocular muscles intact. Conjunctivae/corneas clear. Skin: diaphoretic, cold, slight evolving bruise beneath left eye  Respiratory: On vapotherm 50% with diffuse wheezes, breathing comfortably, diminished air movement  Cardiovascular:  Regular rate and rhythm with normal S1/S2 without murmurs, rubs or gallops. Abdomen: Soft, non-tender, non-distended with normal bowel sounds.   Musculoskeletal:  Sporadically moves extremities, is able to sit up with assistance, 2+ pitting edema bilaterally  Neurologic:  Neurovascularly intact without any focal sensory/motor deficits. Cranial nerves: II-XII intact, grossly non-focal.  Psychiatric:  Alert and oriented, thought content appropriate, normal insight  Capillary Refill: Brisk,3 seconds, normal  Peripheral Pulses: +2 palpable, equal bilaterally       Labs:     Recent Labs     10/09/21  0442   WBC 9.8   HGB 10.3*   HCT 30.7*        Recent Labs     10/09/21  0442   *   K 3.3*   CL 96*   CO2 27   BUN 22*   CREATININE 0.7*   CALCIUM 8.8     Recent Labs     10/09/21  0442   AST 49*   ALT 60*   BILITOT 0.6   ALKPHOS 118     No results for input(s): INR in the last 72 hours. Recent Labs     10/09/21  0442   TROPONINI 0.04*       Urinalysis:      Lab Results   Component Value Date    NITRU POSITIVE 04/09/2021    WBCUA 21-50 04/09/2021    BACTERIA 2+ 04/09/2021    RBCUA None seen 04/09/2021    BLOODU TRACE-LYSED 04/09/2021    SPECGRAV 1.015 04/09/2021    GLUCOSEU Negative 04/09/2021       Radiology:     See above    CT CHEST PULMONARY EMBOLISM W CONTRAST   Final Result   Motion limited. No central pulmonary embolus. Multifocal pneumonia, greatest in the lower lobes. .  There is a background of   mild-to-moderate pulmonary emphysema. Mildly enlarged mediastinal and hilar   nodes are noted, likely reactive in the absence of a known primary. Recommend 3 month follow-up chest CT for further characterization      Severe coronary artery calcification. Diffuse pericardial calcification is   seen.   Pericardial calcification is increased since 2008         XR CHEST PORTABLE   Final Result   Small bibasilar infiltrates may represent atelectasis versus pneumonia             ASSESSMENT:    Active Hospital Problems    Diagnosis Date Noted    Acute hypoxemic respiratory failure due to COVID-19 ([facility] [U07.1, J96.01] 10/09/2021    Pneumonia due to COVID-19 virus [U07.1, J12.82] 10/09/2021         PLAN:    Severe COVID-19 Pneumonia- d/t inability to maintain sats >94% on RA   Sxs onset 10/08? Vaccination status: vaccinated  Date of positive test 10/06  Goal O2 sat >88% (due to underlying COPD)  Remdesivir ordered on 10/09 as the patient is presenting within 7 days of symptom onset. Treatment length 5 days total. End date 10/13 (with extension to 10 days if there is no clinical improvement and in patients on mechanical ventilation)  methylpred 40mg BID IV started 10/09 for total treatment length of 10 days with the end date 10/18 or until medically ready for discharge which ever comes first.   If pt is having increasing increasing oxygen requirements refractory to current corticosteroid therapy and CRP is >/=75 mg/L, ID or pulmonology should be consulted for possible use of Tocilizumab. Anticoagulation: home apixaban  trend CBC, CMP, CRP/DDimer qod  Isolation: 20 days from symptom onset (10/08) depending on severity of illness and underlying co-morbidities/immune status and with evidence of 24 hours with no fever without the use of fever reducing medications and demonstrating other symptoms of COVID-19 are improving. Isolation ends 10/28    H/o of afib- continue home BB, apixaban    CHF- unclear if preserved EF or reduced. No access to records. Previous echos demonstrates preserved EF. On furosemide 60mg at home. Though has lower extremity edema he does not seem grossly decompensated. Hold diuretics for now.    htn- hold home lisinopril 5mg    COPD- continue inhalers    BPH w/ chronic urinary retention- place pearl. Continue tamsulosin and finesteride    H/o constipation- prn miralax    Dementia- CTM. Delirium precautions. H/o agitation. zyprexa prn added. Melatonin nightly    DVT Prophylaxis: home apixaban  Diet: No diet orders on file  Code Status: Prior    PT/OT Eval Status: not ordered    Dispo - pending clinical course       Newton Perez MD    Thank you Genaro Acosta 3030 for the opportunity to be involved in this patient's care.  If you have any

## 2021-10-09 NOTE — CONSULTS
Remdesivir Initiation Note    This patient meets criteria for initiation of remdesivir based on the following:  Proven COVID-19  Moderate disease (Requiring supplemental O2)  Acceptable hepatic function (ALT within 5 times ULN)    Exclusion Criteria:  Severe disease requiring invasive or non-invasive mechanical ventilation (includes HFNC & BiPAP)  Could consider use in patients requiring high flow if early on in the disease course (based on symptom duration)  Use of more than 1 vasopressor prior to remdesivir initiation  Already improving on supportive treatment and/or impending discharge  Patients in whom the clinical team think death is in the immediate short-term where remdesivir is unlikely to change the clinical outcome     Liver function tests will be monitored daily while on remdesivir.      Lenka Vieyra PharmD 10/09/21 11:35 AM

## 2021-10-09 NOTE — PROGRESS NOTES
Marlene Lubin MD   Patient: Candace Ortiz   0'\"   10/9/21 1:47 PM   [phone] Hospital or Facility: [facility] From: [facility] RE: Linda Graves 1934 RM: 430 pt is ordered npo, pt has no teeth, need diet order please ty Need Callback: NO CALLBACK REQ C4 PROGRESSIVE CARE  Unread

## 2021-10-09 NOTE — PROGRESS NOTES
Pt to c430 via transport and staff. Pt settled in, oriented to room and staff. Reviewed poc. Pt appears dazed. Pt is currently 100 percent o2 sats. On hf 7. Will change this to nc when I get the supplies. Telemetry confirmed with cmu afib 80's. Pt in no distress. Rests easily. Pts brief was soaked with urine. Pt was cleansed and changed. Pt in gown and brief, covered. Pt has mask. Pt wearing his glasses. No wounds noted. Buttock discolored but it looks natural. Skin warm and dry. Pt appears to have all motion. Pt has his necessities within reach and his table over him slightly.

## 2021-10-09 NOTE — ED NOTES
Bed: 12  Expected date:   Expected time:   Means of arrival:   Comments:  [place], [address] [facility]  10/09/21 9691

## 2021-10-09 NOTE — PROGRESS NOTES
10/09/21 1816   RT Protocol   History Pulmonary Disease 1   Respiratory pattern 2   Breath sounds 6   Cough 2   Indications for Bronchodilator Therapy Wheezing associated with pulm disorder   Bronchodilator Assessment Score 11

## 2021-10-09 NOTE — PROGRESS NOTES
Received report from ED RN. Pt is stable on 7L hiflow NC, tele box in place, and ready for transport. Transported patient to [facility]. Gave report to primary [employer].   Taran Perez RN

## 2021-10-09 NOTE — ED NOTES
Patient identified as a positive fall risk on the ED triage fall screening. Patient placed in fall precautions which includes:  yellow fall risk bracelet on wrist,patient wearing shoes, \"Be Safe\" sign placed on patient's door, and bed alarm placed under patient/alarm turned on. Patient instructed on importance of not getting out of bed or ambulating without assistance for safety.              Tania Diamond RN  10/09/21 3536

## 2021-10-09 NOTE — ED PROVIDER NOTES
CHIEF COMPLAINT  Positive For Covid-19 (fever, A fib RVR on squad monitor )      HISTORY OF PRESENT ILLNESS  Jason Lu is a 80 y.o. male with a history of Alzheimer's dementia, atrial fibrillation on Eliquis, emphysema on 4 L O2, hypertension who presents emergency department per EMS for evaluation of fever, shortness of breath. Patient is known to be Covid positive. His family had called EMS because of fever to 102 °F. EMS gave the patient 1 g Tylenol and his temperature had improved to 99 °F by arrival. He was initially placed on nonrebreather by EMS. He was transitioned to 4 L nasal cannula after he arrived here. Patient states he has mild shortness of breath. He denies any chest pain. He denies any abdominal pain. No other complaints, modifying factors or associated symptoms. Past Medical History:   Diagnosis Date    Alzheimer disease ([address]     Atrial fibrillation ([facility]     Bronchitis chronic     Chronic urinary tract infection     Emphysema of lung ([address]     Hypertension     Pneumonia     Urinary retention      History reviewed. No pertinent surgical history.   Family History   Problem Relation Age of Onset    Cancer Mother         colon    Emphysema Father     Asthma Father      Social History     Socioeconomic History    Marital status:      Spouse name: Not on file    Number of children: Not on file    Years of education: Not on file    Highest education level: Not on file   Occupational History    Not on file   Tobacco Use    Smoking status: Former Smoker     Packs/day: 2.00     Years: 24.00     Pack years: 48.00     Types: Cigarettes     Quit date: 1982     Years since quittin.7    Smokeless tobacco: Never Used   Vaping Use    Vaping Use: Never used   Substance and Sexual Activity    Alcohol use: No    Drug use: Not Currently    Sexual activity: Not Currently   Other Topics Concern    Not on file   Social History Narrative    Not on file     Social Determinants of Health     Financial Resource Strain:     Difficulty of Paying Living Expenses:    Food Insecurity:     Worried About Running Out of Food in the Last Year:     [address] in the Last Year:    Transportation Needs:     Lack of Transportation (Medical):      Lack of Transportation (Non-Medical):    Physical Activity:     Days of Exercise per Week:     Minutes of Exercise per Session:    Stress:     Feeling of Stress :    Social Connections:     Frequency of Communication with Friends and Family:     Frequency of Social Gatherings with Friends and Family:     Attends Oriental orthodox Services:     Active Member of Clubs or Organizations:     Attends Club or Organization Meetings:     Marital Status:    Intimate Partner Violence:     Fear of Current or Ex-Partner:     Emotionally Abused:     Physically Abused:     Sexually Abused:      Current Facility-Administered Medications   Medication Dose Route Frequency Provider Last Rate Last Admin    apixaban (ELIQUIS) tablet 5 mg  5 mg Oral BID Donte Guzman MD   5 mg at 10/09/21 1447    pravastatin (PRAVACHOL) tablet 20 mg  20 mg Oral Daily Donte Guzman MD   20 mg at 10/09/21 1447    tamsulosin (FLOMAX) capsule 0.4 mg  0.4 mg Oral Nightly Donte Guzman MD        sodium chloride flush 0.9 % injection 5-40 mL  5-40 mL IntraVENous 2 times per day Donte Guzman MD   10 mL at 10/09/21 1450    sodium chloride flush 0.9 % injection 5-40 mL  5-40 mL IntraVENous PRN Donte Guzman MD        0.9 % sodium chloride infusion  25 mL IntraVENous PRN Donte Guzman MD        ondansetron (ZOFRAN-ODT) disintegrating tablet 4 mg  4 mg Oral Q8H PRN Donte Guzman MD        Or    ondansetron (ZOFRAN) injection 4 mg  4 mg IntraVENous Q6H PRN Donte Guzman MD        polyethylene glycol (GLYCOLAX) packet 17 g  17 g Oral Daily PRN Donte Guzman MD        acetaminophen (TYLENOL) tablet 650 mg  650 mg Oral Q6H PRN Autumn Cyr MD        Or    acetaminophen (TYLENOL) suppository 650 mg  650 mg Rectal Q6H PRN Autumn Cyr MD        guaiFENesin-dextromethorphan (ROBITUSSIN DM) 100-10 MG/5ML syrup 5 mL  5 mL Oral Q4H PRN Autumn Cyr MD   5 mL at 10/09/21 1502    methylPREDNISolone sodium (SOLU-MEDROL) injection 40 mg  40 mg IntraVENous Q12H Autumn Cyr MD   40 mg at 10/09/21 1448    albuterol sulfate  (90 Base) MCG/ACT inhaler 2 puff  2 puff Inhalation Q6H PRN Domenic Granados MD        budesonide-formoterol (SYMBICORT) 80-4.5 MCG/ACT inhaler 2 puff  2 puff Inhalation BID Domenic Granados MD   2 puff at 10/09/21 1929    metoprolol tartrate (LOPRESSOR) tablet 12.5 mg  12.5 mg Oral BID Domenic Granados MD        finasteride (PROSCAR) tablet 5 mg  5 mg Oral Daily Domenic Granados MD   5 mg at 10/09/21 1447    melatonin disintegrating tablet 5 mg  5 mg Oral Nightly Domenic Granados MD        OLANZapine zydis (ZYPREXA) disintegrating tablet 2.5 mg  2.5 mg Oral Nightly PRN MD Jeffrey Schmitt ON 10/10/2021] remdesivir 100 mg in sodium chloride 0.9 % 250 mL IVPB  100 mg IntraVENous Q24H Domenic Granados MD        0.9 % sodium chloride bolus  30 mL IntraVENous PRN Domenic Granados MD        Vitamin D (CHOLECALCIFEROL) tablet 2,000 Units  2,000 Units Oral Daily Domenic Granados MD        albuterol sulfate  (90 Base) MCG/ACT inhaler 2 puff  2 puff Inhalation Q4H JOHN Granados MD   2 puff at 10/09/21 1929    ipratropium (ATROVENT HFA) 17 MCG/ACT inhaler 2 puff  2 puff Inhalation Q4H JOHN Granados MD   2 puff at 10/09/21 1929    ipratropium-albuterol (DUONEB) nebulizer solution 1 ampule  1 ampule Inhalation Q4H PRN Domenic Granados MD         No Known Allergies    REVIEW OF SYSTEMS  Positive and pertinent negatives as per HPI. All other systems were reviewed and are negative.     PHYSICAL EXAM  /75   Pulse 69   Temp 97.9 °F (36.6 °C)   Resp 20 Ht 6' 1\" (1.854 m)   Wt 190 lb (86.2 kg)   SpO2 100%   BMI 25.07 kg/m²   GENERAL APPEARANCE: Awake and alert. Cooperative. HEAD: Normocephalic. Atraumatic. EYES: PERRL. EOM's grossly intact. ENT: Mucous membranes are moist.   NECK: Supple, trachea midline. No significant lymphadenopathy  HEART: Tachycardic. No harsh murmurs. Intact radial pulses 2+ bilaterally. LUNGS: Respirations unlabored without accessory muscle use. Speaking comfortably in full sentences. ABDOMEN: Soft. Non-distended. Non-tender. No guarding or rebound. EXTREMITIES: No peripheral edema. No acute deformities. SKIN: Warm and dry. No acute rashes. NEUROLOGICAL: Alert and oriented X 3. No focal deficits  PSYCHIATRIC: Normal mood and affect. LABS  I have reviewed all labs for this visit.    Results for orders placed or performed during the hospital encounter of 10/09/21   CBC Auto Differential   Result Value Ref Range    WBC 9.8 4.0 - 11.0 K/uL    RBC 3.46 (L) 4.20 - 5.90 M/uL    Hemoglobin 10.3 (L) 13.5 - 17.5 g/dL    Hematocrit 30.7 (L) 40.5 - 52.5 %    MCV 88.9 80.0 - 100.0 fL    MCH 29.8 26.0 - 34.0 pg    MCHC 33.5 31.0 - 36.0 g/dL    RDW 15.1 12.4 - 15.4 %    Platelets 594 654 - 020 K/uL    MPV 8.2 5.0 - 10.5 fL    Neutrophils % 88.6 %    Lymphocytes % 3.6 %    Monocytes % 7.4 %    Eosinophils % 0.1 %    Basophils % 0.3 %    Neutrophils Absolute 8.7 (H) 1.7 - 7.7 K/uL    Lymphocytes Absolute 0.4 (L) 1.0 - 5.1 K/uL    Monocytes Absolute 0.7 0.0 - 1.3 K/uL    Eosinophils Absolute 0.0 0.0 - 0.6 K/uL    Basophils Absolute 0.0 0.0 - 0.2 K/uL   Comprehensive Metabolic Panel w/ Reflex to MG   Result Value Ref Range    Sodium 134 (L) 136 - 145 mmol/L    Potassium reflex Magnesium 3.3 (L) 3.5 - 5.1 mmol/L    Chloride 96 (L) 99 - 110 mmol/L    CO2 27 21 - 32 mmol/L    Anion Gap 11 3 - 16    Glucose 145 (H) 70 - 99 mg/dL    BUN 22 (H) 7 - 20 mg/dL    CREATININE 0.7 (L) 0.8 - 1.3 mg/dL    GFR Non-African American >60 >60    GFR  STEVEN (2063) on 10/9/2021 5:17:22 PM       EKG  The Ekg interpreted by myself in the emergency department in the absence of a cardiologist.  atrial fibrillation with a rate of 106  Axis is   Normal  QTc is  within an acceptable range  Intervals and Durations are unremarkable. No specific ST-T wave changes appreciated. Nonspecific ST changes in leads II, 3, aVF  No evidence of acute ischemia. No significant change from prior EKG dated 4/9/2021      RADIOLOGY  X-RAYS:  I have reviewed radiologic plain film image(s). ALL OTHER NON-PLAIN FILM IMAGES SUCH AS CT, ULTRASOUND AND MRI HAVE BEEN READ BY THE RADIOLOGIST. CT CHEST PULMONARY EMBOLISM W CONTRAST   Final Result   Motion limited. No central pulmonary embolus. Multifocal pneumonia, greatest in the lower lobes. .  There is a background of   mild-to-moderate pulmonary emphysema. Mildly enlarged mediastinal and hilar   nodes are noted, likely reactive in the absence of a known primary. Recommend 3 month follow-up chest CT for further characterization      Severe coronary artery calcification. Diffuse pericardial calcification is   seen. Pericardial calcification is increased since 2008         XR CHEST PORTABLE   Final Result   Small bibasilar infiltrates may represent atelectasis versus pneumonia                  Critical Care: Total critical care time is 35 minutes, which excludes separately billable procedures and updating family. Time spent is specifically for management of the presenting complaint and symptoms initially, direct bedside care, reevaluation, review of records, and consultation. There was a high probability of clinically significant life-threatening deterioration in the patient's condition, which required my urgent intervention. ED COURSE/MDM  Patient seen and evaluated. Old records reviewed.  Labs and imaging reviewed and results discussed with patient. This Is an 26-year-old male who presents emergency department for evaluation of shortness of breath, fever in the setting of known COVID-19. Patient arrives tachycardic to the 110s. Patient was transition from nonrebreather to 4 L home nasal cannula. He initially did well on this however about 5 minutes after my initial evaluation, patient was noted to have desaturated to 70% while on 4 L nasal cannula and had significantly increased work of breathing. He was placed back on a nonrebreather and he will be placed on Vapotherm per respiratory therapy. ED evaluation include basic labs, cardiac panel, chest x-ray and CT pulmonary angiogram because of his significant hypoxia. Laboratory evaluation was fairly unremarkable. White blood cell count 9.8. Lactic acid 1.1. Sodium 134, potassium 3.3. Opponent was mildly elevated at 0.04, likely secondary to demand. He does not have new ischemic change that is concerning on EKG. Will be admitted to medicine for continued management of hypoxia secondary to Covid. Admitted pending CTA of the chest.      CLINICAL IMPRESSION  1. Hypoxia    2. COVID-19    3. Atrial fibrillation, unspecified type (HCC)        Blood pressure 123/75, pulse 69, temperature 97.9 °F (36.6 °C), resp. rate 20, height 6' 1\" (1.854 m), weight 190 lb (86.2 kg), SpO2 100 %. Júnior Jha was admitted in stable condition. This chart was generated in part by using Dragon Dictation system and may contain errors related to that system including errors in grammar, punctuation, and spelling, as well as words and phrases that may be inappropriate. If there are any questions or concerns please feel free to contact the dictating provider for clarification.      Rigoberto Farrell MD  10/09/21 3664

## 2021-10-09 NOTE — Clinical Note
Patient Class: Inpatient [101]   REQUIRED: Diagnosis: Acute hypoxemic respiratory failure due to COVID-19 [facility]) [[id number]]   Estimated Length of Stay: Estimated stay of more than 2 midnights

## 2021-10-10 LAB
A/G RATIO: 1.1 (ref 1.1–2.2)
ALBUMIN SERPL-MCNC: 3.6 G/DL (ref 3.4–5)
ALP BLD-CCNC: 110 U/L (ref 40–129)
ALT SERPL-CCNC: 38 U/L (ref 10–40)
ANION GAP SERPL CALCULATED.3IONS-SCNC: 6 MMOL/L (ref 3–16)
AST SERPL-CCNC: 30 U/L (ref 15–37)
BASOPHILS ABSOLUTE: 0 K/UL (ref 0–0.2)
BASOPHILS RELATIVE PERCENT: 0 %
BILIRUB SERPL-MCNC: 0.4 MG/DL (ref 0–1)
BUN BLDV-MCNC: 21 MG/DL (ref 7–20)
CALCIUM SERPL-MCNC: 9 MG/DL (ref 8.3–10.6)
CHLORIDE BLD-SCNC: 98 MMOL/L (ref 99–110)
CO2: 32 MMOL/L (ref 21–32)
CREAT SERPL-MCNC: <0.5 MG/DL (ref 0.8–1.3)
EOSINOPHILS ABSOLUTE: 0 K/UL (ref 0–0.6)
EOSINOPHILS RELATIVE PERCENT: 0 %
GFR AFRICAN AMERICAN: >60
GFR NON-AFRICAN AMERICAN: >60
GLOBULIN: 3.2 G/DL
GLUCOSE BLD-MCNC: 151 MG/DL (ref 70–99)
HCT VFR BLD CALC: 31.1 % (ref 40.5–52.5)
HEMOGLOBIN: 10.3 G/DL (ref 13.5–17.5)
LYMPHOCYTES ABSOLUTE: 0.5 K/UL (ref 1–5.1)
LYMPHOCYTES RELATIVE PERCENT: 5.5 %
MCH RBC QN AUTO: 29.7 PG (ref 26–34)
MCHC RBC AUTO-ENTMCNC: 33 G/DL (ref 31–36)
MCV RBC AUTO: 90.1 FL (ref 80–100)
MONOCYTES ABSOLUTE: 0.4 K/UL (ref 0–1.3)
MONOCYTES RELATIVE PERCENT: 4.6 %
NEUTROPHILS ABSOLUTE: 7.7 K/UL (ref 1.7–7.7)
NEUTROPHILS RELATIVE PERCENT: 89.9 %
PDW BLD-RTO: 15.6 % (ref 12.4–15.4)
PLATELET # BLD: 141 K/UL (ref 135–450)
PMV BLD AUTO: 8.8 FL (ref 5–10.5)
POTASSIUM REFLEX MAGNESIUM: 4.7 MMOL/L (ref 3.5–5.1)
RBC # BLD: 3.46 M/UL (ref 4.2–5.9)
SODIUM BLD-SCNC: 136 MMOL/L (ref 136–145)
TOTAL PROTEIN: 6.8 G/DL (ref 6.4–8.2)
VITAMIN D 25-HYDROXY: 33 NG/ML
WBC # BLD: 8.6 K/UL (ref 4–11)

## 2021-10-10 PROCEDURE — 94669 MECHANICAL CHEST WALL OSCILL: CPT

## 2021-10-10 PROCEDURE — 6370000000 HC RX 637 (ALT 250 FOR IP): Performed by: STUDENT IN AN ORGANIZED HEALTH CARE EDUCATION/TRAINING PROGRAM

## 2021-10-10 PROCEDURE — 6360000002 HC RX W HCPCS: Performed by: INTERNAL MEDICINE

## 2021-10-10 PROCEDURE — 2500000003 HC RX 250 WO HCPCS: Performed by: STUDENT IN AN ORGANIZED HEALTH CARE EDUCATION/TRAINING PROGRAM

## 2021-10-10 PROCEDURE — 85025 COMPLETE CBC W/AUTO DIFF WBC: CPT

## 2021-10-10 PROCEDURE — 82306 VITAMIN D 25 HYDROXY: CPT

## 2021-10-10 PROCEDURE — 1200000000 HC SEMI PRIVATE

## 2021-10-10 PROCEDURE — 80053 COMPREHEN METABOLIC PANEL: CPT

## 2021-10-10 PROCEDURE — 36415 COLL VENOUS BLD VENIPUNCTURE: CPT

## 2021-10-10 PROCEDURE — 94761 N-INVAS EAR/PLS OXIMETRY MLT: CPT

## 2021-10-10 PROCEDURE — 2580000003 HC RX 258: Performed by: INTERNAL MEDICINE

## 2021-10-10 PROCEDURE — 6370000000 HC RX 637 (ALT 250 FOR IP): Performed by: INTERNAL MEDICINE

## 2021-10-10 PROCEDURE — 2580000003 HC RX 258: Performed by: STUDENT IN AN ORGANIZED HEALTH CARE EDUCATION/TRAINING PROGRAM

## 2021-10-10 PROCEDURE — 2700000000 HC OXYGEN THERAPY PER DAY

## 2021-10-10 PROCEDURE — 94640 AIRWAY INHALATION TREATMENT: CPT

## 2021-10-10 RX ORDER — PANTOPRAZOLE SODIUM 40 MG/1
40 TABLET, DELAYED RELEASE ORAL
Status: DISCONTINUED | OUTPATIENT
Start: 2021-10-11 | End: 2021-10-14 | Stop reason: HOSPADM

## 2021-10-10 RX ADMIN — Medication 2000 UNITS: at 09:47

## 2021-10-10 RX ADMIN — APIXABAN 5 MG: 5 TABLET, FILM COATED ORAL at 21:08

## 2021-10-10 RX ADMIN — Medication 2 PUFF: at 20:34

## 2021-10-10 RX ADMIN — TAMSULOSIN HYDROCHLORIDE 0.4 MG: 0.4 CAPSULE ORAL at 00:18

## 2021-10-10 RX ADMIN — METHYLPREDNISOLONE SODIUM SUCCINATE 40 MG: 40 INJECTION, POWDER, FOR SOLUTION INTRAMUSCULAR; INTRAVENOUS at 21:08

## 2021-10-10 RX ADMIN — METOPROLOL TARTRATE 12.5 MG: 25 TABLET, FILM COATED ORAL at 09:49

## 2021-10-10 RX ADMIN — FINASTERIDE 5 MG: 5 TABLET, FILM COATED ORAL at 09:48

## 2021-10-10 RX ADMIN — Medication 2 PUFF: at 08:14

## 2021-10-10 RX ADMIN — REMDESIVIR 100 MG: 100 INJECTION, POWDER, LYOPHILIZED, FOR SOLUTION INTRAVENOUS at 14:53

## 2021-10-10 RX ADMIN — APIXABAN 5 MG: 5 TABLET, FILM COATED ORAL at 00:18

## 2021-10-10 RX ADMIN — Medication 5 MG: at 21:08

## 2021-10-10 RX ADMIN — Medication 5 MG: at 00:18

## 2021-10-10 RX ADMIN — FUROSEMIDE 60 MG: 40 TABLET ORAL at 09:47

## 2021-10-10 RX ADMIN — METOPROLOL TARTRATE 12.5 MG: 25 TABLET, FILM COATED ORAL at 21:08

## 2021-10-10 RX ADMIN — GUAIFENESIN AND DEXTROMETHORPHAN 5 ML: 100; 10 SYRUP ORAL at 09:47

## 2021-10-10 RX ADMIN — TAMSULOSIN HYDROCHLORIDE 0.4 MG: 0.4 CAPSULE ORAL at 21:08

## 2021-10-10 RX ADMIN — SODIUM CHLORIDE, PRESERVATIVE FREE 10 ML: 5 INJECTION INTRAVENOUS at 14:52

## 2021-10-10 RX ADMIN — METOPROLOL TARTRATE 12.5 MG: 25 TABLET, FILM COATED ORAL at 00:19

## 2021-10-10 RX ADMIN — SODIUM CHLORIDE, PRESERVATIVE FREE 10 ML: 5 INJECTION INTRAVENOUS at 00:18

## 2021-10-10 RX ADMIN — Medication 2 PUFF: at 01:22

## 2021-10-10 RX ADMIN — Medication 2000 UNITS: at 01:08

## 2021-10-10 RX ADMIN — Medication 2 PUFF: at 12:11

## 2021-10-10 RX ADMIN — Medication 2 PUFF: at 16:12

## 2021-10-10 RX ADMIN — SODIUM CHLORIDE, PRESERVATIVE FREE 10 ML: 5 INJECTION INTRAVENOUS at 09:47

## 2021-10-10 RX ADMIN — METHYLPREDNISOLONE SODIUM SUCCINATE 40 MG: 40 INJECTION, POWDER, FOR SOLUTION INTRAMUSCULAR; INTRAVENOUS at 00:17

## 2021-10-10 RX ADMIN — APIXABAN 5 MG: 5 TABLET, FILM COATED ORAL at 09:48

## 2021-10-10 RX ADMIN — PRAVASTATIN SODIUM 20 MG: 20 TABLET ORAL at 09:47

## 2021-10-10 RX ADMIN — SODIUM CHLORIDE, PRESERVATIVE FREE 10 ML: 5 INJECTION INTRAVENOUS at 21:08

## 2021-10-10 RX ADMIN — Medication 2 PUFF: at 20:35

## 2021-10-10 RX ADMIN — METHYLPREDNISOLONE SODIUM SUCCINATE 40 MG: 40 INJECTION, POWDER, FOR SOLUTION INTRAMUSCULAR; INTRAVENOUS at 09:48

## 2021-10-10 ASSESSMENT — PAIN SCALES - GENERAL
PAINLEVEL_OUTOF10: 0
PAINLEVEL_OUTOF10: 0

## 2021-10-10 NOTE — PROGRESS NOTES
10/10/21 0814   Oxygen Therapy/Pulse Ox   O2 Therapy Oxygen humidified   $Oxygen $Daily Charge   O2 Device Nasal cannula   O2 Flow Rate (L/min) 4 L/min   Resp 22   SpO2 97 %   $Pulse Oximeter $Spot check (multiple/continuous)   Cough/Sputum   Sputum How Obtained Cough on request;Spontaneous cough;Oral;Suctioned  (Set up suction so pt could use yankaur to suction  self. )   Cough Congested;Moist;Productive   Sputum Amount Large   Sputum Color Creamy; Tan   Tenacity Thick

## 2021-10-10 NOTE — PROGRESS NOTES
According to attending and pts activities he is back to base line with exacerbation of his copd. Another 24 hours of treating s/s of covid and copd wheezing and pts plan is to be d/c back home. Therapies ordered for assessment of strength and home needs. Pt to move off pcu to step down tele ms. Pt is bored and wants to go back home. Pt does not like tv and states he mostly reads during the day. [employer] is his favorite. Searching house for a book for this fellow.

## 2021-10-10 NOTE — CARE COORDINATION
CASE MANAGEMENT INITIAL ASSESSMENT      Reviewed chart and completed assessment via telephone with:patients wifeNayely Both  Explained Case Management role/services. Primary contact information:[facility] Care Decision Maker :   Primary Decision Maker: Aditya Rodríguez Spouse - [phone]    Secondary Decision Maker: Gearld Collet Child - [phone]          Can this person be reached and be able to respond quickly, such as within a few minutes or hours? Yes    Admit date/status:10/9/21  Diagnosis:hypoxia   Is this a Readmission?:  No      Insurance:VA   Precert required for SNF: Yes       3 night stay required: No    Living arrangements, Adls, care needs, prior to admission:lives in 2 story home with wife. Resides on 1st floor. Transportation:tbd     Durable Medical Equipment at home:  Walker_x_Cane__RTS__ BSC__Shower Chair_x_  02_x2 LINC provided by [facility]._ HHN__ CPAP__  [facility] Bed__ W/C_x__ Other__________    Services in the home and/or outpatient, prior to [address] [place] [address]    Dialysis Facility (if applicable)   · Name:  · Address:  · Dialysis Schedule:  · Phone:  · Fax:    PT/OT recs:none    Hospital Exemption Notification (HEN):needed for SNF    Barriers to discharge:none    Plan/comments:spoke with patients wifeNayely Both. Reported they live in 2 story home, resides on first floor. Has support of wife and son and DIL. Has O2 at 2 LNC sometimes they need to increase it to [facility]. She feels like the covid was brought in by the [place] [id number] staff. was active with [facility]. Stated they all have covid may not be able to care for at home was at [facility] in the past and if needed would go back will need further discussion though.  William Armenta RN      ECOC on chart for MD signature

## 2021-10-10 NOTE — PROGRESS NOTES
Hospitalist Progress Note      PCP: Genaro Acosta [id number]    Date of Admission: 10/9/2021    Chief Complaint: [address] Course: Please see H&P 10/09 80 y.o. male who presented to [facility] with fever and cough found to have AHRF in the setting of COVID-19 Pneumonia. Pmhx: dementia parkinsonism features, COPD on 2L continuous, CHF, afib, htn,BPH straight cath    Subjective: improving. Asking to leave. No concerns at this time. Medications:  Reviewed    Infusion Medications    sodium chloride       Scheduled Medications    furosemide  60 mg Oral Daily    apixaban  5 mg Oral BID    pravastatin  20 mg Oral Daily    tamsulosin  0.4 mg Oral Nightly    sodium chloride flush  5-40 mL IntraVENous 2 times per day    methylPREDNISolone  40 mg IntraVENous Q12H    budesonide-formoterol  2 puff Inhalation BID    metoprolol tartrate  12.5 mg Oral BID    finasteride  5 mg Oral Daily    melatonin  5 mg Oral Nightly    remdesivir IVPB  100 mg IntraVENous Q24H    Vitamin D  2,000 Units Oral Daily    albuterol sulfate HFA  2 puff Inhalation Q4H WA    ipratropium  2 puff Inhalation Q4H WA     PRN Meds: sodium chloride flush, sodium chloride, ondansetron **OR** ondansetron, polyethylene glycol, acetaminophen **OR** acetaminophen, guaiFENesin-dextromethorphan, albuterol sulfate HFA, OLANZapine zydis, sodium chloride, ipratropium-albuterol      Intake/Output Summary (Last 24 hours) at 10/10/2021 1733  Last data filed at 10/10/2021 1453  Gross per 24 hour   Intake 780 ml   Output 0 ml   Net 780 ml       Physical Exam Performed:    /77   Pulse 111   Temp 97.8 °F (36.6 °C)   Resp 24   Ht 6' 1\" (1.854 m)   Wt 182 lb 1.6 oz (82.6 kg)   SpO2 95%   BMI 24.03 kg/m²     General appearance: No apparent distress, appears stated age and cooperative. HEENT: edentulous, NC on 2L in place  Respiratory:  Normal respiratory effort. With diffuse wheezing. No crackles noted.   Cardiovascular: Regular rate and rhythm with normal S1/S2 without murmurs, rubs or gallops. Abdomen: Soft, non-tender, non-distended with normal bowel sounds. Musculoskeletal: No clubbing, cyanosis or edema bilaterally. Full range of motion without deformity. Skin: Skin color, texture, turgor normal.  No rashes or lesions. Neurologic: Cranial nerves: II-XII intact, grossly non-focal.  Psychiatric: Alert and oriented to place and situation but easily forgetful. Speech more comprehensible. Capillary Refill: Brisk,3 seconds, normal   Peripheral Pulses: +2 palpable, equal bilaterally       Labs:   Recent Labs     10/09/21  0442 10/10/21  0530   WBC 9.8 8.6   HGB 10.3* 10.3*   HCT 30.7* 31.1*    141     Recent Labs     10/09/21  0442 10/10/21  0530   * 136   K 3.3* 4.7   CL 96* 98*   CO2 27 32   BUN 22* 21*   CREATININE 0.7* <0.5*   CALCIUM 8.8 9.0     Recent Labs     10/09/21  0442 10/10/21  0530   AST 49* 30   ALT 60* 38   BILITOT 0.6 0.4   ALKPHOS 118 110     No results for input(s): INR in the last 72 hours. Recent Labs     10/09/21  0442   TROPONINI 0.04*       Urinalysis:      Lab Results   Component Value Date    NITRU POSITIVE 04/09/2021    WBCUA 21-50 04/09/2021    BACTERIA 2+ 04/09/2021    RBCUA None seen 04/09/2021    BLOODU TRACE-LYSED 04/09/2021    SPECGRAV 1.015 04/09/2021    GLUCOSEU Negative 04/09/2021       Radiology:  CT CHEST PULMONARY EMBOLISM W CONTRAST   Final Result   Motion limited. No central pulmonary embolus. Multifocal pneumonia, greatest in the lower lobes. .  There is a background of   mild-to-moderate pulmonary emphysema. Mildly enlarged mediastinal and hilar   nodes are noted, likely reactive in the absence of a known primary. Recommend 3 month follow-up chest CT for further characterization      Severe coronary artery calcification. Diffuse pericardial calcification is   seen.   Pericardial calcification is increased since 2008         XR CHEST PORTABLE   Final Result   Small bibasilar infiltrates may represent atelectasis versus pneumonia                 Assessment/Plan:    Active Hospital Problems    Diagnosis     Acute hypoxemic respiratory failure due to COVID-19 ([facility] [address] [U07.1, J96.01]     Pneumonia due to COVID-19 virus [U07.1, J12.82]      Severe COVID-19 Pneumonia- d/t inability to maintain sats >92% on home oxygen device  -Sxs onset 10/08?  -Vaccination status: vaccinated  -Date of positive test 10/06  -Goal O2 sat >88% (due to underlying COPD)  -Remdesivir ordered on 10/09 as the patient is presenting within 7 days of symptom onset. Treatment length 5 days total. End date 10/13 (with extension to 10 days if there is no clinical improvement and in patients on mechanical ventilation)  -methylpred 40mg BID IV started 10/09 for total treatment length of 10 days with the end date 10/18 or until medically ready for discharge which ever comes first.   -If pt is having increasing increasing oxygen requirements refractory to current corticosteroid therapy and CRP is >/=75 mg/L, ID or pulmonology should be consulted for possible use of Tocilizumab. -Anticoagulation: home apixaban  trend CBC, CMP, CRP/DDimer qod  -Isolation: 20 days from symptom onset (10/08) depending on severity of illness and underlying co-morbidities/immune status and with evidence of 24 hours with no fever without the use of fever reducing medications and demonstrating other symptoms of COVID-19 are improving. Isolation ends 10/28    Acute on chronic respiratory failure from viral illness and COPD exacerbation. on 2l of oxygen at home continuously. - continue steroids and home inhalers     Atrial fibrillation- rates controlled <110. continue home BB, apixaban     Chronic Heart failure with preserved EF- Echo 2013 with normal EF. No access to records as he is cared for at the [place].   -not currently in exacerbation  - continue home diuretic    Hypertension- controlled with home BB.  Acei inhibitor held but can likely restart if BP stable.      BPH w/ chronic urinary retention- self caths at home. Placed pearl while admitted d/t critical illness. Continue tamsulosin and finesteride     H/o Constipation- prn miralax     Dementia with agitation- controlled well while admitted. Delirium precautions. zyprexa prn added. Melatonin nightly      DVT Prophylaxis: home apixaban  Diet: ADULT DIET;  Dysphagia - Minced and Moist  Code Status: Limited    PT/OT Eval Status: ordered    Dispo - pending clinical course    Jaquelin Padilla MD

## 2021-10-10 NOTE — PROGRESS NOTES
Pt has not teeth, could not eat regular diet, changed to minced and moist and pt ate very well for breakfast lunch and dinner. Drinking adequately. No bm today. Voiding heavily.

## 2021-10-11 LAB
A/G RATIO: 1.1 (ref 1.1–2.2)
ALBUMIN SERPL-MCNC: 3.7 G/DL (ref 3.4–5)
ALP BLD-CCNC: 126 U/L (ref 40–129)
ALT SERPL-CCNC: 63 U/L (ref 10–40)
ANION GAP SERPL CALCULATED.3IONS-SCNC: 10 MMOL/L (ref 3–16)
AST SERPL-CCNC: 63 U/L (ref 15–37)
BILIRUB SERPL-MCNC: 0.5 MG/DL (ref 0–1)
BUN BLDV-MCNC: 31 MG/DL (ref 7–20)
CALCIUM SERPL-MCNC: 9 MG/DL (ref 8.3–10.6)
CHLORIDE BLD-SCNC: 95 MMOL/L (ref 99–110)
CO2: 30 MMOL/L (ref 21–32)
CREAT SERPL-MCNC: 0.7 MG/DL (ref 0.8–1.3)
D DIMER: 444 NG/ML DDU (ref 0–229)
GFR AFRICAN AMERICAN: >60
GFR NON-AFRICAN AMERICAN: >60
GLOBULIN: 3.4 G/DL
GLUCOSE BLD-MCNC: 175 MG/DL (ref 70–99)
HCT VFR BLD CALC: 30.5 % (ref 40.5–52.5)
HEMOGLOBIN: 10.2 G/DL (ref 13.5–17.5)
MCH RBC QN AUTO: 29.8 PG (ref 26–34)
MCHC RBC AUTO-ENTMCNC: 33.5 G/DL (ref 31–36)
MCV RBC AUTO: 89.1 FL (ref 80–100)
PDW BLD-RTO: 15.5 % (ref 12.4–15.4)
PLATELET # BLD: 159 K/UL (ref 135–450)
PMV BLD AUTO: 8.9 FL (ref 5–10.5)
POTASSIUM REFLEX MAGNESIUM: 3.9 MMOL/L (ref 3.5–5.1)
RBC # BLD: 3.42 M/UL (ref 4.2–5.9)
SODIUM BLD-SCNC: 135 MMOL/L (ref 136–145)
TOTAL PROTEIN: 7.1 G/DL (ref 6.4–8.2)
WBC # BLD: 8 K/UL (ref 4–11)

## 2021-10-11 PROCEDURE — 2580000003 HC RX 258: Performed by: NURSE PRACTITIONER

## 2021-10-11 PROCEDURE — 97162 PT EVAL MOD COMPLEX 30 MIN: CPT

## 2021-10-11 PROCEDURE — 85379 FIBRIN DEGRADATION QUANT: CPT

## 2021-10-11 PROCEDURE — 6370000000 HC RX 637 (ALT 250 FOR IP): Performed by: INTERNAL MEDICINE

## 2021-10-11 PROCEDURE — 97530 THERAPEUTIC ACTIVITIES: CPT

## 2021-10-11 PROCEDURE — 85027 COMPLETE CBC AUTOMATED: CPT

## 2021-10-11 PROCEDURE — 6360000002 HC RX W HCPCS: Performed by: NURSE PRACTITIONER

## 2021-10-11 PROCEDURE — 97166 OT EVAL MOD COMPLEX 45 MIN: CPT

## 2021-10-11 PROCEDURE — 80053 COMPREHEN METABOLIC PANEL: CPT

## 2021-10-11 PROCEDURE — 94761 N-INVAS EAR/PLS OXIMETRY MLT: CPT

## 2021-10-11 PROCEDURE — 94669 MECHANICAL CHEST WALL OSCILL: CPT

## 2021-10-11 PROCEDURE — 2700000000 HC OXYGEN THERAPY PER DAY

## 2021-10-11 PROCEDURE — 97535 SELF CARE MNGMENT TRAINING: CPT

## 2021-10-11 PROCEDURE — 2500000003 HC RX 250 WO HCPCS: Performed by: STUDENT IN AN ORGANIZED HEALTH CARE EDUCATION/TRAINING PROGRAM

## 2021-10-11 PROCEDURE — 97116 GAIT TRAINING THERAPY: CPT

## 2021-10-11 PROCEDURE — 6360000002 HC RX W HCPCS: Performed by: INTERNAL MEDICINE

## 2021-10-11 PROCEDURE — 1200000000 HC SEMI PRIVATE

## 2021-10-11 PROCEDURE — 6370000000 HC RX 637 (ALT 250 FOR IP): Performed by: STUDENT IN AN ORGANIZED HEALTH CARE EDUCATION/TRAINING PROGRAM

## 2021-10-11 PROCEDURE — 94640 AIRWAY INHALATION TREATMENT: CPT

## 2021-10-11 PROCEDURE — 2580000003 HC RX 258: Performed by: INTERNAL MEDICINE

## 2021-10-11 PROCEDURE — 2580000003 HC RX 258: Performed by: STUDENT IN AN ORGANIZED HEALTH CARE EDUCATION/TRAINING PROGRAM

## 2021-10-11 PROCEDURE — 36415 COLL VENOUS BLD VENIPUNCTURE: CPT

## 2021-10-11 RX ORDER — FUROSEMIDE 10 MG/ML
20 INJECTION INTRAMUSCULAR; INTRAVENOUS ONCE
Status: COMPLETED | OUTPATIENT
Start: 2021-10-11 | End: 2021-10-11

## 2021-10-11 RX ADMIN — Medication 2 PUFF: at 16:08

## 2021-10-11 RX ADMIN — SODIUM CHLORIDE, PRESERVATIVE FREE 10 ML: 5 INJECTION INTRAVENOUS at 21:12

## 2021-10-11 RX ADMIN — Medication 2 PUFF: at 08:45

## 2021-10-11 RX ADMIN — SODIUM CHLORIDE, PRESERVATIVE FREE 10 ML: 5 INJECTION INTRAVENOUS at 08:25

## 2021-10-11 RX ADMIN — Medication 2 PUFF: at 04:09

## 2021-10-11 RX ADMIN — PRAVASTATIN SODIUM 20 MG: 20 TABLET ORAL at 08:24

## 2021-10-11 RX ADMIN — Medication 2 PUFF: at 12:23

## 2021-10-11 RX ADMIN — Medication 2 PUFF: at 20:22

## 2021-10-11 RX ADMIN — Medication 2 PUFF: at 20:21

## 2021-10-11 RX ADMIN — Medication 2 PUFF: at 12:24

## 2021-10-11 RX ADMIN — Medication 5 MG: at 21:11

## 2021-10-11 RX ADMIN — TAMSULOSIN HYDROCHLORIDE 0.4 MG: 0.4 CAPSULE ORAL at 21:11

## 2021-10-11 RX ADMIN — AZITHROMYCIN MONOHYDRATE 500 MG: 500 INJECTION, POWDER, LYOPHILIZED, FOR SOLUTION INTRAVENOUS at 10:38

## 2021-10-11 RX ADMIN — APIXABAN 5 MG: 5 TABLET, FILM COATED ORAL at 08:24

## 2021-10-11 RX ADMIN — Medication 2000 UNITS: at 08:24

## 2021-10-11 RX ADMIN — METOPROLOL TARTRATE 12.5 MG: 25 TABLET, FILM COATED ORAL at 08:24

## 2021-10-11 RX ADMIN — METOPROLOL TARTRATE 12.5 MG: 25 TABLET, FILM COATED ORAL at 21:11

## 2021-10-11 RX ADMIN — METHYLPREDNISOLONE SODIUM SUCCINATE 40 MG: 40 INJECTION, POWDER, FOR SOLUTION INTRAMUSCULAR; INTRAVENOUS at 21:11

## 2021-10-11 RX ADMIN — FUROSEMIDE 60 MG: 40 TABLET ORAL at 08:24

## 2021-10-11 RX ADMIN — FINASTERIDE 5 MG: 5 TABLET, FILM COATED ORAL at 08:24

## 2021-10-11 RX ADMIN — SODIUM CHLORIDE, PRESERVATIVE FREE 10 ML: 5 INJECTION INTRAVENOUS at 04:39

## 2021-10-11 RX ADMIN — PANTOPRAZOLE SODIUM 40 MG: 40 TABLET, DELAYED RELEASE ORAL at 05:32

## 2021-10-11 RX ADMIN — APIXABAN 5 MG: 5 TABLET, FILM COATED ORAL at 21:11

## 2021-10-11 RX ADMIN — METHYLPREDNISOLONE SODIUM SUCCINATE 40 MG: 40 INJECTION, POWDER, FOR SOLUTION INTRAMUSCULAR; INTRAVENOUS at 08:24

## 2021-10-11 RX ADMIN — Medication 2 PUFF: at 04:11

## 2021-10-11 RX ADMIN — REMDESIVIR 100 MG: 100 INJECTION, POWDER, LYOPHILIZED, FOR SOLUTION INTRAVENOUS at 12:30

## 2021-10-11 RX ADMIN — FUROSEMIDE 20 MG: 10 INJECTION, SOLUTION INTRAMUSCULAR; INTRAVENOUS at 04:38

## 2021-10-11 ASSESSMENT — PAIN SCALES - GENERAL: PAINLEVEL_OUTOF10: 0

## 2021-10-11 NOTE — PROGRESS NOTES
RN attempted to call patients kamila Pineda Gross to notify him that patient is pulling his iv lines and oxygen tubing off. Patient has also become physically and verbally aggressive toward staff. Due to safety risks RN obtained an order for bilateral soft wrist restraints. Patient will be evaluated every 2 hours for range of motion and to assess readiness for discontinuation.

## 2021-10-11 NOTE — PROGRESS NOTES
Pt assessment completed and charted. VSS. Pt a/ox1. Pt denies any pain at this time. RN to wean O2 as tolerated. Bed in lowest position and wheels locked. Call light within reach. Bedside table within reach. Non-skid footwear in place. Pt denies any other needs at this time. Pt calls out appropriately. Will continue to monitor.

## 2021-10-11 NOTE — PLAN OF CARE
Problem: Falls - Risk of:  Goal: Will remain free from falls  Description: Will remain free from falls  10/10/2021 2157 by Susy Pat RN  Outcome: Ongoing     Problem: Falls - Risk of:  Goal: Absence of physical injury  Description: Absence of physical injury  10/10/2021 2157 by Susy Pat RN  Outcome: Ongoing     Problem: Skin Integrity:  Goal: Will show no infection signs and symptoms  Description: Will show no infection signs and symptoms  10/10/2021 2157 by Susy Pat RN  Outcome: Ongoing     Problem: Skin Integrity:  Goal: Absence of new skin breakdown  Description: Absence of new skin breakdown  10/10/2021 2157 by Susy Pat RN  Outcome: Ongoing

## 2021-10-11 NOTE — PROGRESS NOTES
4 Eyes Skin Assessment     The patient is being assess for   Transfer to New Unit    I agree that 2 RN's have performed a thorough Head to Toe Skin Assessment on the patient. ALL assessment sites listed below have been assessed. Areas assessed for pressure by both nurses:   [x]   Head, Face, and Ears   [x]   Shoulders, Back, and Chest, Abdomen  [x]   Arms, Elbows, and Hands   [x]   Coccyx, Sacrum, and Ischium  [x]   Legs, Feet, and Heels        Skin Assessed Under all Medical Devices by both nurses:  O2 device tubing              All Mepilex Borders were peeled back and area peeked at by both nurses:  Yes  Please list where Mepilex Borders are located:  n/a             **SHARE this note so that the co-signing nurse is able to place an eSignature**    Co-signer eSignature: Electronically signed by Leidy Lynch RN on 10/11/21 at 12:12 AM EDT    Does the Patient have Skin Breakdown related to pressure?   No     (Insert Photo here)         Amauri Prevention initiated:  No   Wound Care Orders initiated:  No      [facility] nurse consulted for Pressure Injury (Stage 3,4, Unstageable, DTI, NWPT, Complex wounds)and New or Established Ostomies:  No      Primary Nurse eSignature: Electronically signed by Jose Moon RN on 10/11/21 at 12:09 AM EDT

## 2021-10-11 NOTE — CARE COORDINATION
Call received from [facility] [place] in admissions. She has received referral but they are unable to accept pt until he is 10 days out from positive covid test. CM unable to determine when positive test was from notes. Pt also is unable to verbalize information. Call placed to wife Octavio Neil with no answer. LVM with call back number. Will need alternate facility choice should pt be agreeable to SNF. Will follow.      Augie Ferraro, RN

## 2021-10-11 NOTE — CARE COORDINATION
CM notes that pt has OT recs for SNF this AM; PT eval pending. Per initial assessment, pt reported having been to [facility] [place] and would return if SNF was indicated. Referral faxed via [employer]; will discuss if pt would prefer SNF vs Home today and f/u on referral as appropriate.      Brielle Pike RN

## 2021-10-11 NOTE — PROGRESS NOTES
Occupational Therapy   Occupational Therapy Initial Assessment/Treatment  Date: 10/11/2021   Patient Name: Jason Lu  MRN: [id number]     : 1934    Date of Service: 10/11/2021    Discharge Recommendations:  Subacute/Skilled Nursing Facility       Assessment   Performance deficits / Impairments: Decreased functional mobility ; Decreased ADL status; Decreased endurance;Decreased strength;Decreased balance;Decreased safe awareness;Decreased cognition  Patient admitted from home with COVID-19, rather IPTA, sponges bathes and uses a cane for mobility. Pt required increased time to recover after short bouts of activity, cues for PLB. After evaluation, pt found to be presenting with the above mentioned occupational performance deficits which are affecting participation in daily living skills. Pt would benefit from continued skilled occupational therapy to address ADLs, functional mobility, and safety while in acute care. Prognosis: Good  Decision Making: Medium Complexity  OT Education: OT Role;Plan of Care;Precautions; ADL Adaptive Strategies;Transfer Training;IADL Safety;Equipment  Patient Education: Disease specific: Patient educated on pursed lip breathing to aid in recovery and energy conservation s/p therapeutic activity as related to patient's respiratory condition. Patient verbalized understanding of above education. REQUIRES OT FOLLOW UP: Yes  Activity Tolerance  Activity Tolerance: Patient limited by fatigue  Pt O2 dropped to 84% on 2L increased time to recover. Safety Devices  Safety Devices in place: Yes  Type of devices: Left in chair;Call light within reach;Nurse notified; Chair alarm in place;Gait belt           Patient Diagnosis(es): The primary encounter diagnosis was Hypoxia. Diagnoses of COVID-19 and Atrial fibrillation, unspecified type [facility]) were also pertinent to this visit.      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  Leisure & Hobbies: wanted to try bowling. Objective   Vision: Impaired  Vision Exceptions: Wears glasses at all times  Hearing: Exceptions to [facility]  Hearing Exceptions: Hard of hearing/hearing concerns      Orientation  Overall Orientation Status: Impaired  Orientation Level: Oriented to person;Disoriented to time;Disoriented to place; Disoriented to situation  Observation/Palpation  Posture: Poor  Observation: Kyphotic upper trunk, forward trunk flexion when standing     Balance  Sitting Balance: Contact guard assistance  Standing Balance: Moderate assistance (of 2 with SW)    Functional Mobility  Functional - Mobility Device: Standard Walker  Activity: To/from bathroom  Assist Level: Moderate assistance (of 2, swaying unsteady, SOB)     ADL  Feeding: Setup  UE Dressing: Minimal assistance  LE Dressing: Dependent/Total     Tone RUE  RUE Tone: Normotonic  Tone LUE  LUE Tone: Normotonic  Coordination  Movements Are Fluid And Coordinated: Yes     Bed mobility  Supine to Sit: Stand by assistance  Sit to Supine: Unable to assess (up to chair at end of session)     Transfers  Sit to stand: 2 Person assistance; Moderate assistance (up to SW)  Stand to sit: Moderate assistance;2 Person assistance (cues for safe hand placement)     Cognition  Overall Cognitive Status: Exceptions  Arousal/Alertness: Appropriate responses to stimuli  Following Commands:  Follows one step commands with repetition  Attention Span: Difficulty attending to directions  Memory: Decreased short term memory  Safety Judgement: Decreased awareness of need for assistance;Decreased awareness of need for safety  Problem Solving: Decreased awareness of errors  Insights: Decreased awareness of deficits  Initiation: Requires cues for some  Sequencing: Requires cues for some        LUE AROM (degrees)  LUE AROM : WFL  RUE AROM (degrees)  RUE AROM : WFL  LUE Strength  Gross LUE Strength: WFL  RUE Strength  Gross RUE Strength: WFL         Plan   Plan  Times per week: 2-3x's a week while in acute care    AM-PAC Score     AM-PAC Inpatient Daily Activity Raw Score: 15 (10/11/21 1030)  AM-PAC Inpatient ADL T-Scale Score : 34.69 (10/11/21 1030)  ADL Inpatient CMS 0-100% Score: 56.46 (10/11/21 1030)  ADL Inpatient CMS G-Code Modifier : CK (10/11/21 1030)    Goals  Short term goals  Time Frame for Short term goals: 1 week unless otherwise specified 11/18  Short term goal 1: Pt will complete LE dressing with CGA  Short term goal 2: Pt will complete toilet transfers with CGA by 11/16  Short term goal 3: Pt will complete 10 reps of BUE AROM exercises to increase strength for ADLs/transfers  Patient Goals   Patient goals : \"to go home\"       Therapy Time   Individual Concurrent Group Co-treatment   Time In 0850         Time Out 0939         Minutes 49         Timed Code Treatment Minutes: 39 Minutes       Sharri Staff, OTR/L  If pt is unable to be seen after this session, please let this note serve as discharge summary. Please see case management note for discharge disposition. Thank you.

## 2021-10-11 NOTE — PROGRESS NOTES
Physical Therapy    Facility/Department: [facility] C5 - MED SURG/ORTHO  Initial Assessment    NAME: Kim Michael  : 1934  MRN: [id number]    Date of Service: 10/11/2021    Discharge Recommendations:  Subacute/Skilled Nursing Facility   PT Equipment Recommendations  Equipment Needed: No  Other: TBD at SNF    Assessment   Body structures, Functions, Activity limitations: Decreased functional mobility ; Decreased strength;Decreased endurance;Decreased balance  Assessment: Pt referred for PT evaluation during current hospital stay with dx of hypoxia 2* COVID PNA. Pt currently functioning well below his baseline, requiring modA x 1-2 for most mobility tasks with support of walker. Pt unable to stand for long periods of time or amb >10 feet 2* decreased endurance with activity. Recommend SNF at D/C in light of current deficits. Treatment Diagnosis: Decreased endurance and (I) with functional mobility  Specific instructions for Next Treatment: Progress ther ex and mobility as tolerated  Prognosis: Good;Fair  Decision Making: Medium Complexity  PT Education: Goals; General Safety;Gait Training;PT Role;Plan of Care;Orientation;Disease Specific Education; Functional Mobility Training;Equipment;Precautions;Transfer Training;Energy Conservation  Patient Education: Disease-specific education: Pt educated on benefits of regular OOB activity while in hospital to prevent complications of prolonged bedrest; pt verbalizes some understanding but will require lots of reinforcement. Barriers to Learning: Impaired cognition/memory, decreased motivation  REQUIRES PT FOLLOW UP: Yes  Activity Tolerance: Patient limited by fatigue;Patient limited by endurance  Activity Tolerance: Pt with limited endurance due to baseline emphysema and recent COVID PNA. Pt's HR increased to the mid-120s/low-130s after amb and bed>chair transfer with O2 sats decreasing as low as 84%.   Sats rebounded to 88-90% and HR decreased to resting value (105-110 bpm) after ~3-5 minutes of seated rest with cues for deep, even, pursed-lip breathing. Patient Diagnosis(es): The primary encounter diagnosis was Hypoxia. Diagnoses of COVID-19 and Atrial fibrillation, unspecified type [facility]) were also pertinent to this visit. has a past medical history of Alzheimer disease ([facility] [address] Atrial fibrillation ([facility] [address] Bronchitis chronic, Chronic urinary tract infection, COVID-19, Emphysema of lung ([facility] [address] Hypertension, Pneumonia, and Urinary retention. has no past surgical history on file. Restrictions  Restrictions/Precautions  Restrictions/Precautions: Fall Risk, Isolation, Contact Precautions, Up as Tolerated  Position Activity Restriction  Other position/activity restrictions: COVID-19 +, 2L O2, telemetry, male purewick  Vision/Hearing  Vision: Impaired  Vision Exceptions: Wears glasses at all times  Hearing: Exceptions to [facility]  Hearing Exceptions: Hard of hearing/hearing concerns     Subjective  General  Chart Reviewed: Yes  Patient assessed for rehabilitation services?: Yes  Additional Pertinent Hx: Emphysema, dementia, Parkinson's disease  Family / Caregiver Present: No  Referring Practitioner: Dr. Dia Sheridan  Referral Date : 10/10/21  Diagnosis: Hypoxia 2* COVID PNA  Follows Commands: Within Functional Limits  General Comment  Comments: Pt resting in bed upon entry of therapy staff  Subjective  Subjective: Pt agreeable to work with PT/OT this morning, although hesitant to do too much 2* difficulty breathing. \"I can't do too much, don't push me now. \"  Pain Screening  Patient Currently in Pain: Denies  Intervention List: Patient able to continue with treatment    Vital Signs  Pulse: 110  Heart Rate Source: Monitor  BP: 132/83  Patient Currently in Pain: Denies  Oxygen Therapy  SpO2: 91 %  Pulse Oximeter Device Mode: Continuous  Pulse Oximeter Device Location: Finger;Right  O2 Device: Nasal cannula  O2 Flow Rate (L/min): 2 L/min    Orientation  Orientation  Overall Orientation Status: Impaired  Orientation Level: Oriented to person;Disoriented to place; Disoriented to time;Disoriented to situation     Social/Functional History  Social/Functional History  Lives With: Spouse  Type of Home: House  Home Layout: Two level, Able to Live on Main level with bedroom/bathroom  Home Access: Ramped entrance  Bathroom Shower/Tub: Tub/Shower unit (sponge bathes)  Bathroom Toilet: Standard  Home Equipment: Rolling walker, Cane  Receives Help From: Family  ADL Assistance: Independent   Bath: Supervision (sponge bathes)   Dressing: Supervision   Grooming: Independent   Feeding: Independent   Toileting: Independent  Homemaking Assistance: Independent  Homemaking Responsibilities: Yes   Meal Prep Responsibility: Secondary   Laundry Responsibility: No   Cleaning Responsibility: No  Ambulation Assistance: Independent (with cane)  Transfer Assistance: Independent  Active : No  Occupation: Retired  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  Leisure & Hobbies: wanted to try bowling. Objective  Observation/Palpation  Posture: Poor  Observation: Kyphotic upper trunk, forward trunk flexion when standing    AROM RLE (degrees)  RLE AROM: WFL  AROM LLE (degrees)  LLE AROM : WFL  Strength RLE  Comment: Grossly 4-/5 throughout  Strength LLE  Comment: Grossly 4-/5 throughout     Bed mobility  Supine to Sit: Stand by assistance (increased time needed to complete, HOB elevated ~30 degrees)  Scooting: Stand by assistance     Transfers  Sit to Stand: Moderate Assistance  Stand to sit: Moderate Assistance  Bed to Chair: 2 Person Assistance (modA x 1-2 depending on pt's balance during transfer, fatigues quickly, using std. walker for support)     Ambulation  Surface: level tile  Device: Standard Walker  Assistance: 2 Person assistance (min-modA x 2 depending on pt's balance)  Quality of Gait: Pt amb with slow aaron with short, shuffling steps and moderately unsteady gait.   LOB x 3 (all toward L side), corrected with mod-maxA x 1. Fatigues very quickly and easily with minimal standing activity. Gait Deviations: Slow Edwige;Decreased step length;Decreased step height;Shuffles  Distance: x 10 feet  Comments: Amb distance limited by SOB/fatigue. Balance  Posture: Poor  Sitting - Static: Good;-  Sitting - Dynamic: Fair  Standing - Static: Fair;-  Standing - Dynamic: Poor    Plan   Times per week: 2-3x/week in acute care  Times per day: Daily  Specific instructions for Next Treatment: Progress ther ex and mobility as tolerated  Current Treatment Recommendations: Strengthening, Balance Training, Endurance Training, Functional Mobility Training, Transfer Training, Gait Training, Safety Education & Training, Patient/Caregiver Education & Training, Positioning  Safety Devices: All fall risk precautions in place, Left in chair, Call light within reach, Chair alarm in place, Nurse notified, Gait belt, Patient at risk for falls    AM-PAC Score  AM-PAC Inpatient Mobility Raw Score : 14 (10/11/21 1034)  AM-PAC Inpatient T-Scale Score : 38.1 (10/11/21 1034)  Mobility Inpatient CMS 0-100% Score: 61.29 (10/11/21 1034)  Mobility Inpatient CMS G-Code Modifier : CL (10/11/21 1034)    Goals  Short term goals  Time Frame for Short term goals: 1 week, 10/18/21 (unless otherwise specified)  Short term goal 1: Pt will transfer supine <-> sit with supervision  Short term goal 2: Pt will transfer sit <-> stand and bed>chair using walker with Elisabeth x 1  Short term goal 3: Pt will ambulate x 20 feet using walker with Elisabeth x 1  Short term goal 4: By 10/14/21: Pt will tolerate 12-15 reps BLE exercise for strengthening, balance, and endurance  Patient Goals   Patient goals :  \"To go home\"       Therapy Time   Individual Concurrent Group Co-treatment   Time In 4876         Time Out 0939         Minutes 49         Timed Code Treatment Minutes: [address] [facility], [address], [place] #[id number]    If pt is unable to be seen after this session, please let this note serve as discharge summary. Please see case management note for discharge disposition. Thank you.

## 2021-10-11 NOTE — PROGRESS NOTES
This RN responded to bed alarm. Patient found sitting on side of bed with IV pulled out, oxygen removed, cardiac monitor removed yelling \"I'm going home! \" Patient initially refusing to wear cardiac monitor and replace oxygen - SpO2 on room air 72%. Patient placed on 5L NC and SpO2 improved slowly to 90%. Patient returned to bed; fresh sheets and gown placed. Dressing placed where IV was pulled out. Primary RN, MAGGIE Jurado,  notified of increase in oxygen titration. Bed alarm on, side rails up x2, call light within reach. Patient denies needs other than wanting to leave the hospital. Patient reoriented to room and plan of care. Patient on cont. Cardiac monitoring.

## 2021-10-11 NOTE — PROGRESS NOTES
Hospitalist Progress Note      PCP: Genaro Acosta 8931    Date of Admission: 10/9/2021    Chief Complaint: [address] Course: Please see H&P 10/09 80 y.o. male who presented to [facility] with fever and cough found to have AHRF in the setting of COVID-19 Pneumonia. Pmhx: dementia parkinsonism features, COPD on 2L continuous, CHF, afib, htn,BPH straight cath    Subjective:  EMR and notes reviewed pt seen and examined. Wants to go [facility] to [facility]. Currently thinks he is in [facility]. Does note he is still sob. Tells me he ate eggs and ham however tray is untouched on counter.        Medications:  Reviewed    Infusion Medications    sodium chloride       Scheduled Medications    furosemide  60 mg Oral Daily    pantoprazole  40 mg Oral QAM AC    apixaban  5 mg Oral BID    pravastatin  20 mg Oral Daily    tamsulosin  0.4 mg Oral Nightly    sodium chloride flush  5-40 mL IntraVENous 2 times per day    methylPREDNISolone  40 mg IntraVENous Q12H    budesonide-formoterol  2 puff Inhalation BID    metoprolol tartrate  12.5 mg Oral BID    finasteride  5 mg Oral Daily    melatonin  5 mg Oral Nightly    remdesivir IVPB  100 mg IntraVENous Q24H    Vitamin D  2,000 Units Oral Daily    albuterol sulfate HFA  2 puff Inhalation Q4H WA    ipratropium  2 puff Inhalation Q4H WA     PRN Meds: sodium chloride flush, sodium chloride, ondansetron **OR** ondansetron, polyethylene glycol, acetaminophen **OR** acetaminophen, guaiFENesin-dextromethorphan, albuterol sulfate HFA, OLANZapine zydis, sodium chloride, ipratropium-albuterol      Intake/Output Summary (Last 24 hours) at 10/11/2021 0742  Last data filed at 10/11/2021 0333  Gross per 24 hour   Intake 500 ml   Output 250 ml   Net 250 ml       Physical Exam Performed:    BP (!) 144/92   Pulse 107   Temp 97.8 °F (36.6 °C) (Oral)   Resp 20   Ht 6' 1\" (1.854 m)   Wt 176 lb 5.9 oz (80 kg)   SpO2 95%   BMI 23.27 kg/m²     General appearance: No apparent distress, appears stated age and cooperative. Respiratory:  Normal respiratory effort at rest . With diffuse wheezing. No crackles noted. Coarse breath sounds throughout   Cardiovascular: Regular rate and rhythm with normal S1/S2 without murmurs, rubs or gallops. Abdomen: Soft, non-tender, non-distended with normal bowel sounds. Musculoskeletal: No clubbing, cyanosis or edema bilaterally. Full range of motion without deformity. Skin: Skin color, texture, turgor normal.  No rashes or lesions. Neurologic: Cranial nerves: II-XII intact, grossly non-focal.  Psychiatric: Alert and oriented to place and situation but easily forgetful   Capillary Refill: Brisk,3 seconds, normal   Peripheral Pulses: +2 palpable, equal bilaterally       Labs:   Recent Labs     10/09/21  0442 10/10/21  0530   WBC 9.8 8.6   HGB 10.3* 10.3*   HCT 30.7* 31.1*    141     Recent Labs     10/09/21  0442 10/10/21  0530   * 136   K 3.3* 4.7   CL 96* 98*   CO2 27 32   BUN 22* 21*   CREATININE 0.7* <0.5*   CALCIUM 8.8 9.0     Recent Labs     10/09/21  0442 10/10/21  0530   AST 49* 30   ALT 60* 38   BILITOT 0.6 0.4   ALKPHOS 118 110     No results for input(s): INR in the last 72 hours. Recent Labs     10/09/21  0442   TROPONINI 0.04*       Urinalysis:      Lab Results   Component Value Date    NITRU POSITIVE 04/09/2021    WBCUA 21-50 04/09/2021    BACTERIA 2+ 04/09/2021    RBCUA None seen 04/09/2021    BLOODU TRACE-LYSED 04/09/2021    SPECGRAV 1.015 04/09/2021    GLUCOSEU Negative 04/09/2021       Radiology:  CT CHEST PULMONARY EMBOLISM W CONTRAST   Final Result   Motion limited. No central pulmonary embolus. Multifocal pneumonia, greatest in the lower lobes. .  There is a background of   mild-to-moderate pulmonary emphysema. Mildly enlarged mediastinal and hilar   nodes are noted, likely reactive in the absence of a known primary.    Recommend 3 month follow-up chest CT for further characterization      Severe coronary artery calcification. Diffuse pericardial calcification is   seen. Pericardial calcification is increased since 2008         XR CHEST PORTABLE   Final Result   Small bibasilar infiltrates may represent atelectasis versus pneumonia                 Assessment/Plan:    Active Hospital Problems    Diagnosis     Acute hypoxemic respiratory failure due to COVID-19 ([facility] [address] [U07.1, J96.01]     Pneumonia due to COVID-19 virus [U07.1, J12.82]      Severe COVID-19 Pneumonia- d/t inability to maintain sats >92% on home oxygen device  -Sxs onset 10/08?  -Vaccination status: vaccinated  -Date of positive test 10/06  -Goal O2 sat >88% (due to underlying COPD)  -Remdesivir ordered on 10/09 as the patient is presenting within 7 days of symptom onset. Treatment length 5 days total. End date 10/13 (with extension to 10 days if there is no clinical improvement and in patients on mechanical ventilation)  -methylpred 40mg BID IV started 10/09  -If pt is having increasing increasing oxygen requirements refractory to current corticosteroid therapy and CRP is >/=75 mg/L, ID or pulmonology should be consulted for possible use of Tocilizumab. -Anticoagulation: home apixaban  trend CBC, CMP, CRP/DDimer qod  -Isolation: 20 days from symptom onset (10/08) depending on severity of illness and underlying co-morbidities/immune status and with evidence of 24 hours with no fever without the use of fever reducing medications and demonstrating other symptoms of COVID-19 are improving. Isolation ends 10/28    Acute on chronic respiratory failure from viral illness and COPD exacerbation. on 2l of oxygen at home continuously. - continue steroids and home inhalers  - added azithromycin'     Atrial fibrillation- rates controlled <110. continue home BB, apixaban     Chronic Heart failure with preserved EF- Echo 2013 with normal EF.  No access to records as he is cared for at the [place].   -not currently in exacerbation  - continue home diuretic    Hypertension- controlled with home BB. Acei inhibitor held but can likely restart if BP stable.      BPH w/ chronic urinary retention- self caths at home. Placed pearl while admitted d/t critical illness. Continue tamsulosin and finesteride     H/o Constipation- prn miralax     Dementia with agitation- controlled well while admitted. Delirium precautions. zyprexa prn added. Melatonin nightly      DVT Prophylaxis: home apixaban  Diet: ADULT DIET;  Dysphagia - Minced and Moist  Code Status: Limited    PT/OT Eval Status: ordered    Dispo - pending clinical course    Jaime Mccracken, APRN - CNP

## 2021-10-12 LAB
A/G RATIO: 1.1 (ref 1.1–2.2)
ALBUMIN SERPL-MCNC: 3.8 G/DL (ref 3.4–5)
ALP BLD-CCNC: 122 U/L (ref 40–129)
ALT SERPL-CCNC: 57 U/L (ref 10–40)
ANION GAP SERPL CALCULATED.3IONS-SCNC: 10 MMOL/L (ref 3–16)
AST SERPL-CCNC: 42 U/L (ref 15–37)
BILIRUB SERPL-MCNC: 0.7 MG/DL (ref 0–1)
BUN BLDV-MCNC: 28 MG/DL (ref 7–20)
CALCIUM SERPL-MCNC: 9.2 MG/DL (ref 8.3–10.6)
CHLORIDE BLD-SCNC: 96 MMOL/L (ref 99–110)
CO2: 32 MMOL/L (ref 21–32)
CREAT SERPL-MCNC: 0.7 MG/DL (ref 0.8–1.3)
GFR AFRICAN AMERICAN: >60
GFR NON-AFRICAN AMERICAN: >60
GLOBULIN: 3.4 G/DL
GLUCOSE BLD-MCNC: 159 MG/DL (ref 70–99)
HCT VFR BLD CALC: 31.3 % (ref 40.5–52.5)
HEMOGLOBIN: 10.5 G/DL (ref 13.5–17.5)
MCH RBC QN AUTO: 29.9 PG (ref 26–34)
MCHC RBC AUTO-ENTMCNC: 33.6 G/DL (ref 31–36)
MCV RBC AUTO: 89 FL (ref 80–100)
PDW BLD-RTO: 15.6 % (ref 12.4–15.4)
PLATELET # BLD: 184 K/UL (ref 135–450)
PMV BLD AUTO: 8.8 FL (ref 5–10.5)
POTASSIUM REFLEX MAGNESIUM: 3.7 MMOL/L (ref 3.5–5.1)
RBC # BLD: 3.51 M/UL (ref 4.2–5.9)
SODIUM BLD-SCNC: 138 MMOL/L (ref 136–145)
TOTAL PROTEIN: 7.2 G/DL (ref 6.4–8.2)
WBC # BLD: 8.5 K/UL (ref 4–11)

## 2021-10-12 PROCEDURE — 80053 COMPREHEN METABOLIC PANEL: CPT

## 2021-10-12 PROCEDURE — 2700000000 HC OXYGEN THERAPY PER DAY

## 2021-10-12 PROCEDURE — 6370000000 HC RX 637 (ALT 250 FOR IP): Performed by: INTERNAL MEDICINE

## 2021-10-12 PROCEDURE — 2580000003 HC RX 258: Performed by: INTERNAL MEDICINE

## 2021-10-12 PROCEDURE — 85027 COMPLETE CBC AUTOMATED: CPT

## 2021-10-12 PROCEDURE — 1200000000 HC SEMI PRIVATE

## 2021-10-12 PROCEDURE — 36415 COLL VENOUS BLD VENIPUNCTURE: CPT

## 2021-10-12 PROCEDURE — 2580000003 HC RX 258: Performed by: NURSE PRACTITIONER

## 2021-10-12 PROCEDURE — 94762 N-INVAS EAR/PLS OXIMTRY CONT: CPT

## 2021-10-12 PROCEDURE — 6370000000 HC RX 637 (ALT 250 FOR IP): Performed by: STUDENT IN AN ORGANIZED HEALTH CARE EDUCATION/TRAINING PROGRAM

## 2021-10-12 PROCEDURE — 6360000002 HC RX W HCPCS: Performed by: INTERNAL MEDICINE

## 2021-10-12 PROCEDURE — 2500000003 HC RX 250 WO HCPCS: Performed by: STUDENT IN AN ORGANIZED HEALTH CARE EDUCATION/TRAINING PROGRAM

## 2021-10-12 PROCEDURE — 94669 MECHANICAL CHEST WALL OSCILL: CPT

## 2021-10-12 PROCEDURE — 94761 N-INVAS EAR/PLS OXIMETRY MLT: CPT

## 2021-10-12 PROCEDURE — 6360000002 HC RX W HCPCS: Performed by: NURSE PRACTITIONER

## 2021-10-12 PROCEDURE — 94640 AIRWAY INHALATION TREATMENT: CPT

## 2021-10-12 PROCEDURE — 2580000003 HC RX 258: Performed by: STUDENT IN AN ORGANIZED HEALTH CARE EDUCATION/TRAINING PROGRAM

## 2021-10-12 RX ADMIN — TAMSULOSIN HYDROCHLORIDE 0.4 MG: 0.4 CAPSULE ORAL at 20:53

## 2021-10-12 RX ADMIN — SODIUM CHLORIDE, PRESERVATIVE FREE 10 ML: 5 INJECTION INTRAVENOUS at 08:22

## 2021-10-12 RX ADMIN — PANTOPRAZOLE SODIUM 40 MG: 40 TABLET, DELAYED RELEASE ORAL at 05:54

## 2021-10-12 RX ADMIN — Medication 2000 UNITS: at 08:22

## 2021-10-12 RX ADMIN — Medication 2 PUFF: at 12:18

## 2021-10-12 RX ADMIN — Medication 2 PUFF: at 15:50

## 2021-10-12 RX ADMIN — APIXABAN 5 MG: 5 TABLET, FILM COATED ORAL at 20:53

## 2021-10-12 RX ADMIN — METHYLPREDNISOLONE SODIUM SUCCINATE 40 MG: 40 INJECTION, POWDER, FOR SOLUTION INTRAMUSCULAR; INTRAVENOUS at 09:14

## 2021-10-12 RX ADMIN — APIXABAN 5 MG: 5 TABLET, FILM COATED ORAL at 08:22

## 2021-10-12 RX ADMIN — FINASTERIDE 5 MG: 5 TABLET, FILM COATED ORAL at 08:22

## 2021-10-12 RX ADMIN — METOPROLOL TARTRATE 12.5 MG: 25 TABLET, FILM COATED ORAL at 20:53

## 2021-10-12 RX ADMIN — SODIUM CHLORIDE, PRESERVATIVE FREE 10 ML: 5 INJECTION INTRAVENOUS at 20:53

## 2021-10-12 RX ADMIN — Medication 5 MG: at 20:53

## 2021-10-12 RX ADMIN — FUROSEMIDE 60 MG: 40 TABLET ORAL at 08:22

## 2021-10-12 RX ADMIN — Medication 2 PUFF: at 08:36

## 2021-10-12 RX ADMIN — METOPROLOL TARTRATE 12.5 MG: 25 TABLET, FILM COATED ORAL at 08:22

## 2021-10-12 RX ADMIN — AZITHROMYCIN MONOHYDRATE 500 MG: 500 INJECTION, POWDER, LYOPHILIZED, FOR SOLUTION INTRAVENOUS at 09:14

## 2021-10-12 RX ADMIN — REMDESIVIR 100 MG: 100 INJECTION, POWDER, LYOPHILIZED, FOR SOLUTION INTRAVENOUS at 11:59

## 2021-10-12 RX ADMIN — PRAVASTATIN SODIUM 20 MG: 20 TABLET ORAL at 08:22

## 2021-10-12 RX ADMIN — Medication 2 PUFF: at 08:37

## 2021-10-12 RX ADMIN — OLANZAPINE 2.5 MG: 5 TABLET, ORALLY DISINTEGRATING ORAL at 00:08

## 2021-10-12 RX ADMIN — METHYLPREDNISOLONE SODIUM SUCCINATE 40 MG: 40 INJECTION, POWDER, FOR SOLUTION INTRAMUSCULAR; INTRAVENOUS at 21:19

## 2021-10-12 ASSESSMENT — PAIN SCALES - GENERAL
PAINLEVEL_OUTOF10: 0

## 2021-10-12 ASSESSMENT — PAIN SCALES - WONG BAKER
WONGBAKER_NUMERICALRESPONSE: 0

## 2021-10-12 NOTE — CARE COORDINATION
10/12/21 Notified pt's wife that [facility] [place] can not accept. Pt's wife states she will have to talk with her son to come up with a second facility choice. Will follow.   SNF vs C, pt was in restraints last night for confusion

## 2021-10-12 NOTE — PROGRESS NOTES
Hospitalist Progress Note      PCP: Genaro Acosta 2182    Date of Admission: 10/9/2021    Chief Complaint: [address] Course: Please see H&P 10/09 80 y.o. male who presented to [facility] with fever and cough found to have AHRF in the setting of COVID-19 Pneumonia. Pmhx: dementia parkinsonism features, COPD on 2L continuous, CHF, afib, htn,BPH straight cath    Subjective:  EMR and notes reviewed pt seen and examined. Now in restraints because pulling and tugging at lines and tele. No new issues or complaints     Medications:  Reviewed    Infusion Medications    sodium chloride       Scheduled Medications    azithromycin  500 mg IntraVENous Q24H    furosemide  60 mg Oral Daily    pantoprazole  40 mg Oral QAM AC    apixaban  5 mg Oral BID    pravastatin  20 mg Oral Daily    tamsulosin  0.4 mg Oral Nightly    sodium chloride flush  5-40 mL IntraVENous 2 times per day    methylPREDNISolone  40 mg IntraVENous Q12H    budesonide-formoterol  2 puff Inhalation BID    metoprolol tartrate  12.5 mg Oral BID    finasteride  5 mg Oral Daily    melatonin  5 mg Oral Nightly    remdesivir IVPB  100 mg IntraVENous Q24H    Vitamin D  2,000 Units Oral Daily    albuterol sulfate HFA  2 puff Inhalation Q4H WA    ipratropium  2 puff Inhalation Q4H WA     PRN Meds: sodium chloride flush, sodium chloride, ondansetron **OR** ondansetron, polyethylene glycol, acetaminophen **OR** acetaminophen, guaiFENesin-dextromethorphan, albuterol sulfate HFA, OLANZapine zydis, sodium chloride, ipratropium-albuterol      Intake/Output Summary (Last 24 hours) at 10/12/2021 1838  Last data filed at 10/12/2021 1231  Gross per 24 hour   Intake 480 ml   Output    Net 480 ml       Physical Exam Performed:    BP (!) 143/81   Pulse 76   Temp 97.6 °F (36.4 °C) (Oral)   Resp 20   Ht 6' 1\" (1.854 m)   Wt 176 lb 5.9 oz (80 kg)   SpO2 99%   BMI 23.27 kg/m²     General appearance: No apparent distress, appears stated age. Lethargic on exam   Respiratory:  Normal respiratory effort at rest . With diffuse wheezing. No crackles noted. Coarse breath sounds throughout   Cardiovascular: Regular rate and rhythm with normal S1/S2 without murmurs, rubs or gallops. Abdomen: Soft, non-tender, non-distended with normal bowel sounds. Musculoskeletal: No clubbing, cyanosis or edema bilaterally. Full range of motion without deformity. Skin: Skin color, texture, turgor normal.  No rashes or lesions. Neurologic: Cranial nerves: II-XII intact, grossly non-focal.  Psychiatric:poor insight confused   Capillary Refill: Brisk,3 seconds, normal   Peripheral Pulses: +2 palpable, equal bilaterally       Labs:   Recent Labs     10/10/21  0530 10/11/21  0602 10/12/21  0608   WBC 8.6 8.0 8.5   HGB 10.3* 10.2* 10.5*   HCT 31.1* 30.5* 31.3*    159 184     Recent Labs     10/10/21  0530 10/11/21  0602 10/12/21  0608    135* 138   K 4.7 3.9 3.7   CL 98* 95* 96*   CO2 32 30 32   BUN 21* 31* 28*   CREATININE <0.5* 0.7* 0.7*   CALCIUM 9.0 9.0 9.2     Recent Labs     10/10/21  0530 10/11/21  0602 10/12/21  0608   AST 30 63* 42*   ALT 38 63* 57*   BILITOT 0.4 0.5 0.7   ALKPHOS 110 126 122     No results for input(s): INR in the last 72 hours. No results for input(s): Erika Parisian in the last 72 hours. Urinalysis:      Lab Results   Component Value Date    NITRU POSITIVE 04/09/2021    WBCUA 21-50 04/09/2021    BACTERIA 2+ 04/09/2021    RBCUA None seen 04/09/2021    BLOODU TRACE-LYSED 04/09/2021    SPECGRAV 1.015 04/09/2021    GLUCOSEU Negative 04/09/2021       Radiology:  CT CHEST PULMONARY EMBOLISM W CONTRAST   Final Result   Motion limited. No central pulmonary embolus. Multifocal pneumonia, greatest in the lower lobes. .  There is a background of   mild-to-moderate pulmonary emphysema. Mildly enlarged mediastinal and hilar   nodes are noted, likely reactive in the absence of a known primary.    Recommend 3 month follow-up chest CT for further characterization      Severe coronary artery calcification. Diffuse pericardial calcification is   seen. Pericardial calcification is increased since 2008         XR CHEST PORTABLE   Final Result   Small bibasilar infiltrates may represent atelectasis versus pneumonia                 Assessment/Plan:    Active Hospital Problems    Diagnosis     Acute hypoxemic respiratory failure due to COVID-19 ([facility] [address] [U07.1, J96.01]     Pneumonia due to COVID-19 virus [U07.1, J12.82]      Severe COVID-19 Pneumonia- d/t inability to maintain sats >92% on home oxygen device  -Sxs onset 10/08?  -Vaccination status: vaccinated  -Date of positive test 10/06  -Goal O2 sat >88% (due to underlying COPD)  -Remdesivir ordered on 10/09 as the patient is presenting within 7 days of symptom onset. Treatment length 5 days total. End date 10/13 (with extension to 10 days if there is no clinical improvement and in patients on mechanical ventilation)  -methylpred 40mg BID IV started 10/09  -If pt is having increasing increasing oxygen requirements refractory to current corticosteroid therapy and CRP is >/=75 mg/L, ID or pulmonology should be consulted for possible use of Tocilizumab. -Anticoagulation: home apixaban  trend CBC, CMP, CRP/DDimer qod  -Isolation: 20 days from symptom onset (10/08) depending on severity of illness and underlying co-morbidities/immune status and with evidence of 24 hours with no fever without the use of fever reducing medications and demonstrating other symptoms of COVID-19 are improving. Isolation ends 10/28    Acute on chronic respiratory failure from viral illness and COPD exacerbation. on 2l of oxygen at home continuously. - continue steroids and home inhalers  - added azithromycin'     Atrial fibrillation- rates controlled <110. continue home BB, apixaban     Chronic Heart failure with preserved EF- Echo 2013 with normal EF.  No access to records as he is cared for at the [place].   -not currently in exacerbation  - continue home diuretic    Hypertension- controlled with home BB. Acei inhibitor held but can likely restart if BP stable.      BPH w/ chronic urinary retention- self caths at home. Placed pearl while admitted d/t critical illness. Continue tamsulosin and finesteride     H/o Constipation- prn miralax     Dementia with agitation- controlled well while admitted. Delirium precautions. zyprexa prn added. Melatonin nightly      DVT Prophylaxis: home apixaban  Diet: ADULT DIET;  Dysphagia - Minced and Moist  Code Status: Limited    PT/OT Eval Status: ordered    Dispo - pending clinical course    Yee Berger, JOSR - CNP

## 2021-10-12 NOTE — PROGRESS NOTES
10/12/21 1919   Oxygen Therapy/Pulse Ox   O2 Device Nasal cannula   O2 Flow Rate (L/min) 2 L/min   SpO2 99 %

## 2021-10-13 LAB
A/G RATIO: 1 (ref 1.1–2.2)
ALBUMIN SERPL-MCNC: 3.2 G/DL (ref 3.4–5)
ALP BLD-CCNC: 107 U/L (ref 40–129)
ALT SERPL-CCNC: 50 U/L (ref 10–40)
ANION GAP SERPL CALCULATED.3IONS-SCNC: 8 MMOL/L (ref 3–16)
AST SERPL-CCNC: 30 U/L (ref 15–37)
BILIRUB SERPL-MCNC: 0.7 MG/DL (ref 0–1)
BUN BLDV-MCNC: 30 MG/DL (ref 7–20)
C-REACTIVE PROTEIN: 46.1 MG/L (ref 0–5.1)
CALCIUM SERPL-MCNC: 8.6 MG/DL (ref 8.3–10.6)
CHLORIDE BLD-SCNC: 97 MMOL/L (ref 99–110)
CO2: 33 MMOL/L (ref 21–32)
CREAT SERPL-MCNC: 0.6 MG/DL (ref 0.8–1.3)
D DIMER: 326 NG/ML DDU (ref 0–229)
GFR AFRICAN AMERICAN: >60
GFR NON-AFRICAN AMERICAN: >60
GLOBULIN: 3.1 G/DL
GLUCOSE BLD-MCNC: 180 MG/DL (ref 70–99)
HCT VFR BLD CALC: 29.8 % (ref 40.5–52.5)
HEMOGLOBIN: 9.9 G/DL (ref 13.5–17.5)
MCH RBC QN AUTO: 29.4 PG (ref 26–34)
MCHC RBC AUTO-ENTMCNC: 33.3 G/DL (ref 31–36)
MCV RBC AUTO: 88.2 FL (ref 80–100)
PDW BLD-RTO: 15.2 % (ref 12.4–15.4)
PLATELET # BLD: 198 K/UL (ref 135–450)
PMV BLD AUTO: 8.8 FL (ref 5–10.5)
POTASSIUM REFLEX MAGNESIUM: 3.6 MMOL/L (ref 3.5–5.1)
RBC # BLD: 3.38 M/UL (ref 4.2–5.9)
SODIUM BLD-SCNC: 138 MMOL/L (ref 136–145)
TOTAL PROTEIN: 6.3 G/DL (ref 6.4–8.2)
WBC # BLD: 12 K/UL (ref 4–11)

## 2021-10-13 PROCEDURE — 2580000003 HC RX 258: Performed by: STUDENT IN AN ORGANIZED HEALTH CARE EDUCATION/TRAINING PROGRAM

## 2021-10-13 PROCEDURE — 85379 FIBRIN DEGRADATION QUANT: CPT

## 2021-10-13 PROCEDURE — 2500000003 HC RX 250 WO HCPCS: Performed by: STUDENT IN AN ORGANIZED HEALTH CARE EDUCATION/TRAINING PROGRAM

## 2021-10-13 PROCEDURE — 86140 C-REACTIVE PROTEIN: CPT

## 2021-10-13 PROCEDURE — 6360000002 HC RX W HCPCS: Performed by: INTERNAL MEDICINE

## 2021-10-13 PROCEDURE — 97110 THERAPEUTIC EXERCISES: CPT

## 2021-10-13 PROCEDURE — 36415 COLL VENOUS BLD VENIPUNCTURE: CPT

## 2021-10-13 PROCEDURE — 6370000000 HC RX 637 (ALT 250 FOR IP): Performed by: STUDENT IN AN ORGANIZED HEALTH CARE EDUCATION/TRAINING PROGRAM

## 2021-10-13 PROCEDURE — 2700000000 HC OXYGEN THERAPY PER DAY

## 2021-10-13 PROCEDURE — 80053 COMPREHEN METABOLIC PANEL: CPT

## 2021-10-13 PROCEDURE — 2580000003 HC RX 258: Performed by: INTERNAL MEDICINE

## 2021-10-13 PROCEDURE — 97530 THERAPEUTIC ACTIVITIES: CPT

## 2021-10-13 PROCEDURE — 94761 N-INVAS EAR/PLS OXIMETRY MLT: CPT

## 2021-10-13 PROCEDURE — 6360000002 HC RX W HCPCS: Performed by: NURSE PRACTITIONER

## 2021-10-13 PROCEDURE — 97116 GAIT TRAINING THERAPY: CPT

## 2021-10-13 PROCEDURE — 6370000000 HC RX 637 (ALT 250 FOR IP): Performed by: INTERNAL MEDICINE

## 2021-10-13 PROCEDURE — 94669 MECHANICAL CHEST WALL OSCILL: CPT

## 2021-10-13 PROCEDURE — 2580000003 HC RX 258: Performed by: NURSE PRACTITIONER

## 2021-10-13 PROCEDURE — 94640 AIRWAY INHALATION TREATMENT: CPT

## 2021-10-13 PROCEDURE — 85027 COMPLETE CBC AUTOMATED: CPT

## 2021-10-13 PROCEDURE — 1200000000 HC SEMI PRIVATE

## 2021-10-13 RX ADMIN — Medication 2000 UNITS: at 09:19

## 2021-10-13 RX ADMIN — Medication 5 MG: at 20:13

## 2021-10-13 RX ADMIN — Medication 2 PUFF: at 19:19

## 2021-10-13 RX ADMIN — METOPROLOL TARTRATE 12.5 MG: 25 TABLET, FILM COATED ORAL at 20:13

## 2021-10-13 RX ADMIN — Medication 2 PUFF: at 07:54

## 2021-10-13 RX ADMIN — APIXABAN 5 MG: 5 TABLET, FILM COATED ORAL at 09:20

## 2021-10-13 RX ADMIN — PANTOPRAZOLE SODIUM 40 MG: 40 TABLET, DELAYED RELEASE ORAL at 05:22

## 2021-10-13 RX ADMIN — SODIUM CHLORIDE, PRESERVATIVE FREE 10 ML: 5 INJECTION INTRAVENOUS at 09:25

## 2021-10-13 RX ADMIN — Medication 2 PUFF: at 11:39

## 2021-10-13 RX ADMIN — SODIUM CHLORIDE, PRESERVATIVE FREE 10 ML: 5 INJECTION INTRAVENOUS at 20:15

## 2021-10-13 RX ADMIN — METHYLPREDNISOLONE SODIUM SUCCINATE 40 MG: 40 INJECTION, POWDER, FOR SOLUTION INTRAMUSCULAR; INTRAVENOUS at 09:20

## 2021-10-13 RX ADMIN — Medication 2 PUFF: at 19:20

## 2021-10-13 RX ADMIN — Medication 2 PUFF: at 15:21

## 2021-10-13 RX ADMIN — METHYLPREDNISOLONE SODIUM SUCCINATE 40 MG: 40 INJECTION, POWDER, FOR SOLUTION INTRAMUSCULAR; INTRAVENOUS at 21:50

## 2021-10-13 RX ADMIN — FUROSEMIDE 60 MG: 40 TABLET ORAL at 09:19

## 2021-10-13 RX ADMIN — TAMSULOSIN HYDROCHLORIDE 0.4 MG: 0.4 CAPSULE ORAL at 20:13

## 2021-10-13 RX ADMIN — PRAVASTATIN SODIUM 20 MG: 20 TABLET ORAL at 09:21

## 2021-10-13 RX ADMIN — FINASTERIDE 5 MG: 5 TABLET, FILM COATED ORAL at 09:19

## 2021-10-13 RX ADMIN — REMDESIVIR 100 MG: 100 INJECTION, POWDER, LYOPHILIZED, FOR SOLUTION INTRAVENOUS at 12:10

## 2021-10-13 RX ADMIN — AZITHROMYCIN MONOHYDRATE 500 MG: 500 INJECTION, POWDER, LYOPHILIZED, FOR SOLUTION INTRAVENOUS at 09:25

## 2021-10-13 RX ADMIN — METOPROLOL TARTRATE 12.5 MG: 25 TABLET, FILM COATED ORAL at 09:20

## 2021-10-13 RX ADMIN — APIXABAN 5 MG: 5 TABLET, FILM COATED ORAL at 20:13

## 2021-10-13 RX ADMIN — Medication 2 PUFF: at 07:55

## 2021-10-13 RX ADMIN — Medication 2 PUFF: at 15:20

## 2021-10-13 ASSESSMENT — PAIN SCALES - GENERAL
PAINLEVEL_OUTOF10: 0
PAINLEVEL_OUTOF10: 0

## 2021-10-13 NOTE — CARE COORDINATION
Cm spoke with pt wife Lindsey Ma via phone for new SNF choice. She states that she has decided to bring pt back home at [place] bc he tends to get agitated and not do well in SNF setting. She would like to resume [facility] services with [facility]. Pt does have home O2 with [facility]; he wears 2L/NC at baseline. Of note, pt orders do need to go through the [facility] for approval. Will need [facility] orders ASAP to set up though Daniel Liu PCP. Will follow.      Syd Yoo RN

## 2021-10-13 NOTE — PROGRESS NOTES
Physical Therapy  Facility/Department: [facility] C5 - MED SURG/ORTHO  Daily Treatment Note  NAME: Dina Salgado  : 1934  MRN: [id number]    Date of Service: 10/13/2021    Discharge Recommendations:  Subacute/Skilled Nursing Facility   PT Equipment Recommendations  Equipment Needed: No  Other: TBD at SNF    Assessment   Body structures, Functions, Activity limitations: Decreased functional mobility ; Decreased strength;Decreased endurance;Decreased balance  Assessment: Pt was seen as a co treat with OT today to safely progress mobility. Pt demos need for A of 2 supine to sit and sit to stand today. He had moderate desaturations with standing stepping activites at walker, RN had us turn O2 up to 3L during those times. Pt sat at chair X 20 minutes for ex with extended rests and cued breathing on 2L with sats maintained 88% or better. Pt is recommended for con't skilled PT and SNF at D/C. Treatment Diagnosis: Decreased endurance and (I) with functional mobility  Specific instructions for Next Treatment: Progress ther ex and mobility as tolerated  Prognosis: Good;Fair  Decision Making: Medium Complexity  PT Education: Goals; General Safety;Gait Training;PT Role;Plan of Care;Orientation;Disease Specific Education; Functional Mobility Training;Equipment;Precautions;Transfer Training;Energy Conservation  Patient Education: Disease-specific education: Pt educated on benefits of regular OOB activity while in hospital to prevent complications of prolonged bedrest; pt verbalizes some understanding but will require lots of reinforcement. Barriers to Learning: Impaired cognition/memory, decreased motivation  REQUIRES PT FOLLOW UP: Yes  Activity Tolerance  Activity Tolerance: Amb distance limited by SOB/fatigue, desaturations. Standing on 2L X 30 seconds produced desaturations as low as 84% with little recovery at 2 minutes. RN called and pt turned p to 3L for standing and stepping acitivies.   Returned to 2L for ex and once in bed. During seated chair ex Pt required extended rest breaks due to desaturations, cues PLB to recover in 1-2 minutes on 2 L during ex portion   Vitals:    10/13/21    BP: 115/79   Pulse: 88   Resp:    Temp:    SpO2: 92%         Patient Diagnosis(es): The primary encounter diagnosis was Hypoxia. Diagnoses of COVID-19 and Atrial fibrillation, unspecified type [facility]) were also pertinent to this visit. has a past medical history of Alzheimer disease ([facility] [address] Atrial fibrillation ([facility] [address] Bronchitis chronic, Chronic urinary tract infection, COVID-19, Emphysema of lung ([facility] [address] Hypertension, Pneumonia, and Urinary retention. has no past surgical history on file. Restrictions  Restrictions/Precautions  Restrictions/Precautions: Fall Risk, Isolation, Contact Precautions, Up as Tolerated  Position Activity Restriction  Other position/activity restrictions: COVID-19 +, 2L O2, telemetry, male purewick  Subjective   General  Chart Reviewed: Yes  Additional Pertinent Hx: Emphysema, dementia, Parkinson's disease  Response To Previous Treatment: Patient with no complaints from previous session. Family / Caregiver Present: No  Referring Practitioner: Dr. Lizzette Hou  Subjective  Subjective: Pt agreeable to work with PT/OT this morning  General Comment  Comments: Pt resting in bed upon entry of therapy staff  Pain Screening  Patient Currently in Pain: Denies  Vital Signs  Patient Currently in Pain: Denies          Objective   Bed mobility  Supine to Sit: Dependent/Total;2 Person assistance; Moderate assistance;Maximum assistance  Sit to Supine: Dependent/Total;2 Person assistance;Minimal assistance  Transfers  Sit to Stand: Dependent/Total;2 Person Assistance;Minimal Assistance; Moderate Assistance  Stand to sit: Dependent/Total;2 Person Assistance;Minimal Assistance  Bed to Chair: Dependent/Total;2 Person Assistance;Minimal assistance; Moderate assistance (with RW)  Ambulation  Ambulation?: Yes  Ambulation 1  Surface: level tile  Device: Standard Walker  Assistance: Dependent/Total;2 Person assistance;Minimal assistance; Moderate assistance  Quality of Gait: Pt amb with slow aaron with short, shuffling steps and moderately unsteady gait. LOB posteriorly with A to recover. Fatigues very quickly and easily with minimal standing activity. Gait Deviations: Slow Aaron;Decreased step length;Decreased step height;Shuffles  Distance: 3-4'  Comments: Amb distance limited by SOB/fatigue, desaturations. Standing on 2L X 30 seconds produced desaturations as low as 84% with little recovery at 2 minutes. RN called and pt turned p to 3L for standing and stepping acitivies. Returned to 2L for ex and once in bed. Exercises  Hip Flexion: seated marches X 10 BLE  Knee Long Arc Quad: X 10 BLE  Ankle Pumps: X 15 BLE HR/TR, ROM limited  Comments: Pt required extended rest breaks due to desaturations, cues PLB to recover in 1-2 minutes on 2 L during ex portion        AM-PAC Score  AM-PAC Inpatient Mobility Raw Score : 13 (10/13/21 1019)  AM-PAC Inpatient T-Scale Score : 36.74 (10/13/21 1019)  Mobility Inpatient CMS 0-100% Score: 64.91 (10/13/21 1019)  Mobility Inpatient CMS G-Code Modifier : CL (10/13/21 1019)          Goals  Short term goals  Time Frame for Short term goals: 1 week, 10/18/21 (unless otherwise specified)  Short term goal 1: Pt will transfer supine <-> sit with supervision; 10/13 max/ mod a of 2  Short term goal 2: Pt will transfer sit <-> stand and bed>chair using walker with Elisabeth x 1; 10/13 mod A of 2  Short term goal 3: Pt will ambulate x 20 feet using walker with Elisabeth x 1; 10/13 3-4' with SW and mod A of 2  Short term goal 4: By 10/14/21: Pt will tolerate 12-15 reps BLE exercise for strengthening, balance, and endurance; 10/13 progressing  Patient Goals   Patient goals :  \"To go home\"    Plan    Plan  Times per week: 2-3x/week in acute care  Times per day: Daily  Specific instructions for Next Treatment: Progress ther ex and mobility as tolerated  Current Treatment Recommendations: Strengthening, Balance Training, Endurance Training, Functional Mobility Training, Transfer Training, Gait Training, Safety Education & Training, Patient/Caregiver Education & Training, Positioning  Safety Devices  Type of devices:  All fall risk precautions in place, Left in chair, Call light within reach, Nurse notified, Gait belt, Bed alarm in place, Left in bed     Therapy Time   Individual Concurrent Group Co-treatment   Time In 0920         Time Out [address]

## 2021-10-13 NOTE — PROGRESS NOTES
Occupational Therapy  Facility/Department: [facility] C5 - MED SURG/ORTHO  Daily Treatment Note  NAME: Jeffrey Manriquez  : 1934  MRN: [id number]    Date of Service: 10/13/2021    Discharge Recommendations:  Subacute/Skilled Nursing Facility       Assessment   Performance deficits / Impairments: Decreased functional mobility ; Decreased ADL status; Decreased endurance;Decreased strength;Decreased balance;Decreased safe awareness;Decreased cognition  Assessment: Pt demos fair tolerance of OT session, limited by poor endurance and O2 sats dropping with static stand and minimal activity. Pt requires seated rest breaks after stand step transfers and rest breaks with seated UE therex. Pt demos need for Ax2 with bed mobility and transfers this date. Co-tx collaboration this date with PT staff to safely progress pt toward goals. Pt will have better occupational performance outcomes within a co-treatment than 1:1 session. Pt functioning below his baseline and would benefit from skilled OT in SNF setting at d/c. Prognosis: Fair  OT Education: OT Role;Plan of Care;Transfer Training;IADL Safety; Energy Conservation;Home Exercise Program  Disease Specific Education: Pt educated on importance of OOB mobility, prevention of complications of bedrest, and general safety during hospitalization. Pt verbalized understanding, but would benefit from reinforcement due to cognition.   Barriers to Learning: cognition  REQUIRES OT FOLLOW UP: Yes  Activity Tolerance  Activity Tolerance: Patient limited by fatigue;Treatment limited secondary to medical complications (free text)  Activity Tolerance: Vitals: OT=651/79, HR= 80, jumps to 130s with minimal activity, SPO2= 81% after 1 min static stand on 2L, required increase to 3L and seated rest break ~3 mins for recovery to 88%, at EOS pt 94% on 2L, RN aware  Safety Devices  Safety Devices in place: Yes  Type of devices: Left in bed;Bed alarm in place;Call light within reach;Nurse notified;Gait belt Patient Diagnosis(es): The primary encounter diagnosis was Hypoxia. Diagnoses of COVID-19 and Atrial fibrillation, unspecified type [facility]) were also pertinent to this visit. has a past medical history of Alzheimer disease ([facility] [address] Atrial fibrillation ([facility] [address] Bronchitis chronic, Chronic urinary tract infection, COVID-19, Emphysema of lung ([facility] [address] Hypertension, Pneumonia, and Urinary retention. has no past surgical history on file. Restrictions  Restrictions/Precautions  Restrictions/Precautions: Fall Risk, Isolation, Contact Precautions, Up as Tolerated  Position Activity Restriction  Other position/activity restrictions: COVID-19 +, 2L O2, telemetry     Subjective   General  Chart Reviewed: Yes, Orders, Progress Notes, History and Physical, Labs  Patient assessed for rehabilitation services?: Yes  Additional Pertinent Hx: dementia  Response to previous treatment: Patient with no complaints from previous session  Family / Caregiver Present: No  Referring Practitioner: Zhao Gonzalez MD 10/10/21  Diagnosis: Acute on chronic respiratory failure from viral illness and COPD exacerbation; Severe COVID-19 Pneumonia    Subjective  Subjective: Pt resting in bed, pleasant and agreeable to OT treatment. Vital Signs  Patient Currently in Pain: Denies     Orientation  Orientation  Orientation Level: Oriented to person     Objective    Balance  Sitting Balance: Contact guard assistance  Standing Balance: Dependent/Total (min-mod A of 2 with SW)  Standing Balance  Activity: 1 static stand ~ 1 min at SW; bed <-> chair transfers with SW    Bed mobility  Supine to Sit: Dependent/Total;2 Person assistance (mod-max A of 2)  Sit to Supine: Dependent/Total;2 Person assistance (min A of 2)     Transfers  Sit to stand: Dependent/Total;2 Person assistance (min-mod A of 2)  Stand to sit: Dependent/Total;2 Person assistance (min A of 2)     Cognition  Overall Cognitive Status: Exceptions  Following Commands:  Follows one step commands with repetition  Attention Span: Attends with cues to redirect  Memory: Decreased short term memory  Safety Judgement: Decreased awareness of need for assistance;Decreased awareness of need for safety  Insights: Decreased awareness of deficits  Initiation: Requires cues for some  Sequencing: Requires cues for some     Type of ROM/Therapeutic Exercise  Type of ROM/Therapeutic Exercise: AROM  Comment: seated with rest breaks  Exercises  Shoulder Flexion: 10x  Horizontal ABduction: 10x  Horizontal ADduction: 10x  Elbow Flexion: 10x  Elbow Extension: 10x     Plan   Plan  Times per week: 2-3x's a week while in acute care    AM-PAC Score  AM-[facility] Inpatient Daily Activity Raw Score: 14 (10/13/21 1325)  AM-PAC Inpatient ADL T-Scale Score : 33.39 (10/13/21 1325)  ADL Inpatient CMS 0-100% Score: 59.67 (10/13/21 1325)  ADL Inpatient CMS G-Code Modifier : CK (10/13/21 1325)    Goals  Short term goals  Time Frame for Short term goals: 1 week unless otherwise specified 11/18  Short term goal 1: Pt will complete LE dressing with CGA-- ongoing 10/13/21  Short term goal 2: Pt will complete toilet transfers with CGA by 11/16-- ongoing 10/13/21  Short term goal 3: Pt will complete 10 reps of BUE AROM exercises to increase strength for ADLs/transfers-- ongoing 10/13/21  Patient Goals   Patient goals : \"to go home\"       Therapy Time   Individual Concurrent Group Co-treatment   Time In 0920         Time Out 1005         Minutes Christine Flores., QURESHI/L

## 2021-10-13 NOTE — PROGRESS NOTES
Hospitalist Progress Note      PCP: Genaro Acosta 8474    Date of Admission: 10/9/2021    Chief Complaint: [address] Course: Please see H&P 10/09 80 y.o. male who presented to [facility] with fever and cough found to have AHRF in the setting of COVID-19 Pneumonia. Pmhx: dementia parkinsonism features, COPD on 2L continuous, CHF, afib, htn,BPH straight cath    Subjective:  EMR and notes reviewed pt seen and examined. No acute events overnight out of restraints conversant still slightly confused. Assisted patient at bedside was able to feed himself some applesauce and drink approximately 8 ounces of lemonade without any coughing or sputtering.   His main concern is when he is getting out of here    Medications:  Reviewed    Infusion Medications    sodium chloride       Scheduled Medications    azithromycin  500 mg IntraVENous Q24H    furosemide  60 mg Oral Daily    pantoprazole  40 mg Oral QAM AC    apixaban  5 mg Oral BID    pravastatin  20 mg Oral Daily    tamsulosin  0.4 mg Oral Nightly    sodium chloride flush  5-40 mL IntraVENous 2 times per day    methylPREDNISolone  40 mg IntraVENous Q12H    budesonide-formoterol  2 puff Inhalation BID    metoprolol tartrate  12.5 mg Oral BID    finasteride  5 mg Oral Daily    melatonin  5 mg Oral Nightly    remdesivir IVPB  100 mg IntraVENous Q24H    Vitamin D  2,000 Units Oral Daily    albuterol sulfate HFA  2 puff Inhalation Q4H WA    ipratropium  2 puff Inhalation Q4H WA     PRN Meds: sodium chloride flush, sodium chloride, ondansetron **OR** ondansetron, polyethylene glycol, acetaminophen **OR** acetaminophen, guaiFENesin-dextromethorphan, albuterol sulfate HFA, OLANZapine zydis, sodium chloride, ipratropium-albuterol      Intake/Output Summary (Last 24 hours) at 10/13/2021 0855  Last data filed at 10/13/2021 0842  Gross per 24 hour   Intake 200 ml   Output    Net 200 ml       Physical Exam Performed:    /79   Pulse 88   Temp 97.3 °F (36.3 °C) (Axillary)   Resp 20   Ht 6' 1\" (1.854 m)   Wt 176 lb 5.9 oz (80 kg)   SpO2 94%   BMI 23.27 kg/m²     General appearance: No apparent distress, appears stated age. Lethargic on exam   Respiratory:  Normal respiratory effort at rest . With diffuse wheezing. No crackles noted. Coarse breath sounds throughout. Currently on 2 L per nasal cannula  Cardiovascular: Regular rate and rhythm with normal S1/S2 without murmurs, rubs or gallops. Abdomen: Soft, non-tender, non-distended with normal bowel sounds. Musculoskeletal: No clubbing, cyanosis or edema bilaterally. Full range of motion without deformity. Skin: Skin color, texture, turgor normal.  No rashes or lesions. Neurologic: Cranial nerves: II-XII intact, grossly non-focal.  Psychiatric:poor insight confused   Capillary Refill: Brisk,3 seconds, normal   Peripheral Pulses: +2 palpable, equal bilaterally       Labs:   Recent Labs     10/11/21  0602 10/12/21  0608 10/13/21  0633   WBC 8.0 8.5 12.0*   HGB 10.2* 10.5* 9.9*   HCT 30.5* 31.3* 29.8*    184 198     Recent Labs     10/11/21  0602 10/12/21  0608 10/13/21  0633   * 138 138   K 3.9 3.7 3.6   CL 95* 96* 97*   CO2 30 32 33*   BUN 31* 28* 30*   CREATININE 0.7* 0.7* 0.6*   CALCIUM 9.0 9.2 8.6     Recent Labs     10/11/21  0602 10/12/21  0608 10/13/21  0633   AST 63* 42* 30   ALT 63* 57* 50*   BILITOT 0.5 0.7 0.7   ALKPHOS 126 122 107     No results for input(s): INR in the last 72 hours. No results for input(s): Gabby Doswell in the last 72 hours. Urinalysis:      Lab Results   Component Value Date    NITRU POSITIVE 04/09/2021    WBCUA 21-50 04/09/2021    BACTERIA 2+ 04/09/2021    RBCUA None seen 04/09/2021    BLOODU TRACE-LYSED 04/09/2021    SPECGRAV 1.015 04/09/2021    GLUCOSEU Negative 04/09/2021       Radiology:  CT CHEST PULMONARY EMBOLISM W CONTRAST   Final Result   Motion limited. No central pulmonary embolus.       Multifocal pneumonia, greatest in the lower lobes..  There is a background of   mild-to-moderate pulmonary emphysema. Mildly enlarged mediastinal and hilar   nodes are noted, likely reactive in the absence of a known primary. Recommend 3 month follow-up chest CT for further characterization      Severe coronary artery calcification. Diffuse pericardial calcification is   seen. Pericardial calcification is increased since 2008         XR CHEST PORTABLE   Final Result   Small bibasilar infiltrates may represent atelectasis versus pneumonia                 Assessment/Plan:    Active Hospital Problems    Diagnosis     Acute hypoxemic respiratory failure due to COVID-19 ([facility] [address] [U07.1, J96.01]     Pneumonia due to COVID-19 virus [U07.1, J12.82]      Severe COVID-19 Pneumonia- d/t inability to maintain sats >92% on home oxygen device  -Sxs onset 10/08?  -Vaccination status: vaccinated  -Date of positive test 10/06  -Goal O2 sat >88% (due to underlying COPD)  -Remdesivir ordered on 10/09 as the patient is presenting within 7 days of symptom onset. Treatment length 5 days total. End date 10/13 (with extension to 10 days if there is no clinical improvement and in patients on mechanical ventilation)  -methylpred 40mg BID IV started 10/09  -If pt is having increasing increasing oxygen requirements refractory to current corticosteroid therapy and CRP is >/=75 mg/L, ID or pulmonology should be consulted for possible use of Tocilizumab. -Anticoagulation: home apixaban  trend CBC, CMP, CRP/DDimer qod  -Isolation: 20 days from symptom onset (10/08) depending on severity of illness and underlying co-morbidities/immune status and with evidence of 24 hours with no fever without the use of fever reducing medications and demonstrating other symptoms of COVID-19 are improving. Isolation ends 10/28    Acute on chronic respiratory failure from viral illness and COPD exacerbation. on 2l of oxygen at home continuously.    - continue steroids and home inhalers  - added

## 2021-10-14 VITALS
HEART RATE: 89 BPM | DIASTOLIC BLOOD PRESSURE: 77 MMHG | OXYGEN SATURATION: 93 % | BODY MASS INDEX: 23.37 KG/M2 | WEIGHT: 176.37 LBS | TEMPERATURE: 98 F | HEIGHT: 73 IN | RESPIRATION RATE: 18 BRPM | SYSTOLIC BLOOD PRESSURE: 163 MMHG

## 2021-10-14 LAB
A/G RATIO: 1 (ref 1.1–2.2)
ALBUMIN SERPL-MCNC: 3.2 G/DL (ref 3.4–5)
ALP BLD-CCNC: 117 U/L (ref 40–129)
ALT SERPL-CCNC: 50 U/L (ref 10–40)
ANION GAP SERPL CALCULATED.3IONS-SCNC: 6 MMOL/L (ref 3–16)
AST SERPL-CCNC: 29 U/L (ref 15–37)
BILIRUB SERPL-MCNC: 0.7 MG/DL (ref 0–1)
BUN BLDV-MCNC: 27 MG/DL (ref 7–20)
CALCIUM SERPL-MCNC: 8.8 MG/DL (ref 8.3–10.6)
CHLORIDE BLD-SCNC: 97 MMOL/L (ref 99–110)
CO2: 35 MMOL/L (ref 21–32)
CREAT SERPL-MCNC: 0.6 MG/DL (ref 0.8–1.3)
GFR AFRICAN AMERICAN: >60
GFR NON-AFRICAN AMERICAN: >60
GLOBULIN: 3.1 G/DL
GLUCOSE BLD-MCNC: 196 MG/DL (ref 70–99)
HCT VFR BLD CALC: 30.9 % (ref 40.5–52.5)
HEMOGLOBIN: 10.2 G/DL (ref 13.5–17.5)
MAGNESIUM: 2.3 MG/DL (ref 1.8–2.4)
MCH RBC QN AUTO: 29.7 PG (ref 26–34)
MCHC RBC AUTO-ENTMCNC: 33.1 G/DL (ref 31–36)
MCV RBC AUTO: 89.9 FL (ref 80–100)
PDW BLD-RTO: 15 % (ref 12.4–15.4)
PLATELET # BLD: 228 K/UL (ref 135–450)
PMV BLD AUTO: 8.4 FL (ref 5–10.5)
POTASSIUM REFLEX MAGNESIUM: 3.5 MMOL/L (ref 3.5–5.1)
RBC # BLD: 3.44 M/UL (ref 4.2–5.9)
SARS-COV-2, NAAT: DETECTED
SODIUM BLD-SCNC: 138 MMOL/L (ref 136–145)
TOTAL PROTEIN: 6.3 G/DL (ref 6.4–8.2)
WBC # BLD: 13.7 K/UL (ref 4–11)

## 2021-10-14 PROCEDURE — 6360000002 HC RX W HCPCS: Performed by: NURSE PRACTITIONER

## 2021-10-14 PROCEDURE — 87635 SARS-COV-2 COVID-19 AMP PRB: CPT

## 2021-10-14 PROCEDURE — 36415 COLL VENOUS BLD VENIPUNCTURE: CPT

## 2021-10-14 PROCEDURE — 6370000000 HC RX 637 (ALT 250 FOR IP): Performed by: INTERNAL MEDICINE

## 2021-10-14 PROCEDURE — 83735 ASSAY OF MAGNESIUM: CPT

## 2021-10-14 PROCEDURE — 6360000002 HC RX W HCPCS: Performed by: INTERNAL MEDICINE

## 2021-10-14 PROCEDURE — 2700000000 HC OXYGEN THERAPY PER DAY

## 2021-10-14 PROCEDURE — 6370000000 HC RX 637 (ALT 250 FOR IP): Performed by: NURSE PRACTITIONER

## 2021-10-14 PROCEDURE — 85027 COMPLETE CBC AUTOMATED: CPT

## 2021-10-14 PROCEDURE — 94640 AIRWAY INHALATION TREATMENT: CPT

## 2021-10-14 PROCEDURE — 6370000000 HC RX 637 (ALT 250 FOR IP): Performed by: STUDENT IN AN ORGANIZED HEALTH CARE EDUCATION/TRAINING PROGRAM

## 2021-10-14 PROCEDURE — 94669 MECHANICAL CHEST WALL OSCILL: CPT

## 2021-10-14 PROCEDURE — 80053 COMPREHEN METABOLIC PANEL: CPT

## 2021-10-14 PROCEDURE — 94761 N-INVAS EAR/PLS OXIMETRY MLT: CPT

## 2021-10-14 PROCEDURE — 2580000003 HC RX 258: Performed by: NURSE PRACTITIONER

## 2021-10-14 PROCEDURE — 2580000003 HC RX 258: Performed by: INTERNAL MEDICINE

## 2021-10-14 RX ORDER — FINASTERIDE 5 MG/1
5 TABLET, FILM COATED ORAL DAILY
Qty: 30 TABLET | Refills: 3 | Status: SHIPPED | OUTPATIENT
Start: 2021-10-15

## 2021-10-14 RX ORDER — PREDNISONE 10 MG/1
10 TABLET ORAL DAILY
Status: DISCONTINUED | OUTPATIENT
Start: 2021-10-14 | End: 2021-10-14

## 2021-10-14 RX ORDER — PREDNISONE 20 MG/1
20 TABLET ORAL DAILY
Qty: 3 TABLET | Refills: 0 | Status: SHIPPED | OUTPATIENT
Start: 2021-10-20 | End: 2021-10-23

## 2021-10-14 RX ORDER — FUROSEMIDE 20 MG/1
60 TABLET ORAL DAILY
Qty: 60 TABLET | Refills: 3 | Status: SHIPPED | OUTPATIENT
Start: 2021-10-15

## 2021-10-14 RX ORDER — PREDNISONE 20 MG/1
40 TABLET ORAL DAILY
Qty: 6 TABLET | Refills: 0 | Status: SHIPPED | OUTPATIENT
Start: 2021-10-14 | End: 2021-10-17

## 2021-10-14 RX ORDER — PREDNISONE 20 MG/1
20 TABLET ORAL DAILY
Status: DISCONTINUED | OUTPATIENT
Start: 2021-10-20 | End: 2021-10-14 | Stop reason: HOSPADM

## 2021-10-14 RX ORDER — PREDNISONE 10 MG/1
30 TABLET ORAL DAILY
Qty: 9 TABLET | Refills: 0 | Status: SHIPPED | OUTPATIENT
Start: 2021-10-17 | End: 2021-10-20

## 2021-10-14 RX ORDER — GUAIFENESIN/DEXTROMETHORPHAN 100-10MG/5
5 SYRUP ORAL EVERY 4 HOURS PRN
Qty: 120 ML | Refills: 0 | Status: SHIPPED | OUTPATIENT
Start: 2021-10-14 | End: 2021-10-24

## 2021-10-14 RX ORDER — POLYETHYLENE GLYCOL 3350 17 G/17G
17 POWDER, FOR SOLUTION ORAL DAILY PRN
Qty: 527 G | Refills: 1 | COMMUNITY
Start: 2021-10-14 | End: 2021-11-13

## 2021-10-14 RX ORDER — PREDNISONE 10 MG/1
10 TABLET ORAL DAILY
Status: DISCONTINUED | OUTPATIENT
Start: 2021-10-23 | End: 2021-10-14 | Stop reason: HOSPADM

## 2021-10-14 RX ORDER — PREDNISONE 20 MG/1
40 TABLET ORAL DAILY
Status: DISCONTINUED | OUTPATIENT
Start: 2021-10-14 | End: 2021-10-14 | Stop reason: HOSPADM

## 2021-10-14 RX ORDER — PANTOPRAZOLE SODIUM 40 MG/1
40 TABLET, DELAYED RELEASE ORAL
Qty: 30 TABLET | Refills: 3 | Status: SHIPPED | OUTPATIENT
Start: 2021-10-15

## 2021-10-14 RX ORDER — PREDNISONE 10 MG/1
10 TABLET ORAL DAILY
Qty: 3 TABLET | Refills: 0 | Status: SHIPPED | OUTPATIENT
Start: 2021-10-23 | End: 2021-10-26

## 2021-10-14 RX ADMIN — METOPROLOL TARTRATE 12.5 MG: 25 TABLET, FILM COATED ORAL at 09:46

## 2021-10-14 RX ADMIN — FINASTERIDE 5 MG: 5 TABLET, FILM COATED ORAL at 09:46

## 2021-10-14 RX ADMIN — SODIUM CHLORIDE, PRESERVATIVE FREE 10 ML: 5 INJECTION INTRAVENOUS at 09:47

## 2021-10-14 RX ADMIN — Medication 2 PUFF: at 07:43

## 2021-10-14 RX ADMIN — FUROSEMIDE 60 MG: 40 TABLET ORAL at 09:46

## 2021-10-14 RX ADMIN — PANTOPRAZOLE SODIUM 40 MG: 40 TABLET, DELAYED RELEASE ORAL at 06:19

## 2021-10-14 RX ADMIN — GUAIFENESIN AND DEXTROMETHORPHAN 5 ML: 100; 10 SYRUP ORAL at 10:05

## 2021-10-14 RX ADMIN — PRAVASTATIN SODIUM 20 MG: 20 TABLET ORAL at 09:46

## 2021-10-14 RX ADMIN — METHYLPREDNISOLONE SODIUM SUCCINATE 40 MG: 40 INJECTION, POWDER, FOR SOLUTION INTRAMUSCULAR; INTRAVENOUS at 10:04

## 2021-10-14 RX ADMIN — Medication 2000 UNITS: at 09:46

## 2021-10-14 RX ADMIN — AZITHROMYCIN MONOHYDRATE 500 MG: 500 INJECTION, POWDER, LYOPHILIZED, FOR SOLUTION INTRAVENOUS at 10:07

## 2021-10-14 RX ADMIN — Medication 2 PUFF: at 11:33

## 2021-10-14 RX ADMIN — Medication 2 PUFF: at 16:03

## 2021-10-14 RX ADMIN — PREDNISONE 40 MG: 20 TABLET ORAL at 16:00

## 2021-10-14 RX ADMIN — Medication 2 PUFF: at 11:32

## 2021-10-14 RX ADMIN — APIXABAN 5 MG: 5 TABLET, FILM COATED ORAL at 09:52

## 2021-10-14 ASSESSMENT — PAIN SCALES - GENERAL
PAINLEVEL_OUTOF10: 0

## 2021-10-14 ASSESSMENT — PAIN SCALES - WONG BAKER
WONGBAKER_NUMERICALRESPONSE: 0

## 2021-10-14 NOTE — CARE COORDINATION
10/14/21 Spoke to pt's wife and she wants bring the pt home and resume HHC with [facility] and pt's wife requested transport home via ambulance she asked for [facility] as they have used them in the past. I called [address] [place] [address] and they were active with the pt prior to admission and can take him back.

## 2021-10-14 NOTE — CARE COORDINATION
10/14/21 Received a call from [address] [place] [address] that they can not open on this pt until 10/20/21 due to their Covid policy. [facility]. I spoke to his NP Sherley Galaviz and she stated that she didn't think they could find another agency due to this pt's location. I faxed over orders to her to work on [address]. Pt's wife does not want SNF as she feels like that will just make the pt more agitated and he will not work with therapy there anyway.       Korina Rubio NP with the [place]    Phone: [phone]  Fax [phone]

## 2021-10-14 NOTE — ACP (ADVANCE CARE PLANNING)
Advance Care Planning Note    Name: Jennifer Ledesma  YOB: 1934  MRN: [id number]  Admission Date: 10/9/2021  4:20 AM    Date of Discussion: 10/14/21   Active Diagnoses:    COVID, Chronic resp faiure, Emphysema HLD, HTN, PAF     These active diagnoses are of sufficient risk that focused discussion on advance care planning is indicated in order to allow the patient to thoughtfully consider personal goals of care; and, if situations arise that prevent the ability to personally give input, to ensure appropriate representation of their personal desires for different levels and agressiveness of care. Discussion:    Persons present and participating in discussion: Jennifer LedesmaLeora, JOSR - CNP,     Patient's  surrogate: has full capacity     Discussion in summary: Discussed if pt/famil would have interest in [address] [place] [address]. Would like to follow up as Op with [facility]     Code status: DNR    Time Spent:     Total time spent face-to-face in education and discussion: 30 minutes.

## 2021-10-14 NOTE — DISCHARGE SUMMARY
Hospital Medicine Discharge Summary    Patient ID: Keegan Tran      Patient's PCP: [address] Date: 10/9/2021     Discharge Date:   10/14/21    Admitting Physician: Jaquelin Padilla MD     Discharge Physician: JORS No - CNP     Discharge Diagnoses: Active Hospital Problems    Diagnosis     Acute hypoxemic respiratory failure due to COVID-19 ([facility] [address] [U07.1, J96.01]     Pneumonia due to COVID-19 virus [U07.1, J12.82]        The patient was seen and examined on day of discharge and this discharge summary is in conjunction with any daily progress note from day of discharge. Hospital Course: 80 y. o. male who presented to [facility] with fever and cough found to have AHRF in the setting of COVID-19 Pneumonia. Pmhx: dementia parkinsonism features, COPD on 2L continuous, CHF, afib, htn,BPH straight cath    Severe COVID-19 Pneumonia- d/t inability to maintain sats >92% on home oxygen device  -Sxs onset 10/08?  -Vaccination status: vaccinated  -Date of positive test 10/06  -Goal O2 sat >88% (due to underlying COPD)  -Remdesivir ordered on 10/09 as the patient is presenting within 7 days of symptom onset. Treatment length 5 days total. End date 10/13 -methylpred 40mg BID IV started 10/09 changed to PO prednisone taper at DC   -Anticoagulation: home apixaban  trended CBC, CMP, CRP/DDimer  -Isolation: 20 days from symptom onset (10/08) depending on severity of illness and underlying co-morbidities/immune status and with evidence of 24 hours with no fever without the use of fever reducing medications and demonstrating other symptoms of COVID-19 are improving. Isolation ends 10/28  - stable on ~ 3 liter per NC, continued with upper airway congestion      Acute on chronic respiratory failure from viral illness and COPD exacerbation. on 2l of oxygen at home continuously.    - continue steroids and home inhalers  - added azithromycin'     Atrial fibrillation- rates controlled <110. continue home BB, apixaban     Chronic Heart failure with preserved EF- Echo 2013 with normal EF. No access to records as he is cared for at the [place] [facility].   -not currently in exacerbation  - continue home diuretic     Hypertension- controlled with home BB. Acei inhibitor held but can likely restart if BP stable.      BPH w/ chronic urinary retention- self caths at home. Placed pearl while admitted d/t critical illness. Continue tamsulosin and finesteride     H/o Constipation- prn miralax     Dementia with agitation- controlled well while admitted. Delirium precautions. zyprexa prn added. Melatonin nightly     Attempted to have pt D to SNF, but pt wife and son feel they would prefer to have pt home with [facility], [facility], this was discussed with family they still prefer to to take home home the son will be staying with them 24 hours a day and is familiar with his fathers needs and care and is comfortable with this plan. They have all DME they need currently       Physical Exam Performed:     /71   Pulse 122   Temp 97.6 °F (36.4 °C) (Oral)   Resp 18   Ht 6' 1\" (1.854 m)   Wt 176 lb 5.9 oz (80 kg)   SpO2 90%   BMI 23.27 kg/m²     General appearance: No apparent distress, appears stated age. Lethargic on exam   Respiratory:  Normal respiratory effort at rest . With diffuse wheezing. No crackles noted. Coarse breath sounds throughout. Currently on 2 L per nasal cannula  Cardiovascular: Regular rate and rhythm with normal S1/S2 without murmurs, rubs or gallops. Abdomen: Soft, non-tender, non-distended with normal bowel sounds. Musculoskeletal: No clubbing, cyanosis or edema bilaterally. Full range of motion without deformity. Skin: Skin color, texture, turgor normal.  No rashes or lesions.   Neurologic: Cranial nerves: II-XII intact, grossly non-focal.  Psychiatric:poor insight confused   Capillary Refill: Brisk,3 seconds, normal   Peripheral Pulses: +2 palpable, equal bilaterally          Labs: For convenience and continuity at follow-up the following most recent labs are provided:      CBC:    Lab Results   Component Value Date    WBC 13.7 10/14/2021    HGB 10.2 10/14/2021    HCT 30.9 10/14/2021     10/14/2021       Renal:    Lab Results   Component Value Date     10/14/2021    K 3.5 10/14/2021    CL 97 10/14/2021    CO2 35 10/14/2021    BUN 27 10/14/2021    CREATININE 0.6 10/14/2021    CALCIUM 8.8 10/14/2021    PHOS 3.2 03/16/2019         Significant Diagnostic Studies    Radiology:   CT CHEST PULMONARY EMBOLISM W CONTRAST   Final Result   Motion limited. No central pulmonary embolus. Multifocal pneumonia, greatest in the lower lobes. .  There is a background of   mild-to-moderate pulmonary emphysema. Mildly enlarged mediastinal and hilar   nodes are noted, likely reactive in the absence of a known primary. Recommend 3 month follow-up chest CT for further characterization      Severe coronary artery calcification. Diffuse pericardial calcification is   seen.   Pericardial calcification is increased since 2008         XR CHEST PORTABLE   Final Result   Small bibasilar infiltrates may represent atelectasis versus pneumonia                Consults:     IP CONSULT TO PHARMACY  IP CONSULT TO HOME CARE NEEDS    Disposition:  Home with [facility] resuming on 10/20     Condition at Discharge: Stable    Discharge Instructions/Follow-up:  PCP    Code Status:  Limited     Activity: activity as tolerated    Diet: modified      Discharge Medications:     Current Discharge Medication List           Details   guaiFENesin-dextromethorphan (ROBITUSSIN DM) 100-10 MG/5ML syrup Take 5 mLs by mouth every 4 hours as needed for Cough  Qty: 120 mL, Refills: 0      furosemide (LASIX) 20 MG tablet Take 3 tablets by mouth daily  Qty: 60 tablet, Refills: 3      polyethylene glycol (GLYCOLAX) 17 g packet Take 17 g by mouth daily as needed for Constipation  Qty: 527 g, Refills: 1 finasteride (PROSCAR) 5 MG tablet Take 1 tablet by mouth daily  Qty: 30 tablet, Refills: 3      pantoprazole (PROTONIX) 40 MG tablet Take 1 tablet by mouth every morning (before breakfast)  Qty: 30 tablet, Refills: 3      !! predniSONE (DELTASONE) 20 MG tablet Take 2 tablets by mouth daily for 3 doses  Qty: 6 tablet, Refills: 0      !! predniSONE (DELTASONE) 10 MG tablet Take 3 tablets by mouth daily for 3 doses  Qty: 9 tablet, Refills: 0      !! predniSONE (DELTASONE) 10 MG tablet Take 1 tablet by mouth daily for 3 doses  Qty: 3 tablet, Refills: 0      !! predniSONE (DELTASONE) 20 MG tablet Take 1 tablet by mouth daily for 3 doses  Qty: 3 tablet, Refills: 0       !! - Potential duplicate medications found. Please discuss with provider. Details   apixaban (ELIQUIS) 5 MG TABS tablet Take 5 mg by mouth 2 times daily      LORazepam (ATIVAN) 0.5 MG tablet Take 0.5 mg by mouth every 12 hours. fluticasone-vilanterol (BREO ELLIPTA) 100-25 MCG/INH AEPB inhaler Inhale 2 puffs into the lungs 2 times daily      tiotropium (SPIRIVA) 18 MCG inhalation capsule Inhale 18 mcg into the lungs daily      lisinopril (PRINIVIL;ZESTRIL) 20 MG tablet Take 20 mg by mouth daily      melatonin 3 MG TABS tablet Take 9 mg by mouth daily      metoprolol tartrate (LOPRESSOR) 25 MG tablet Take 12.5 mg by mouth 2 times daily      Skin Protectants, Misc. (EUCERIN) cream Apply topically as needed for Dry Skin Apply topically as needed. tamsulosin (FLOMAX) 0.4 MG capsule Take 1 capsule by mouth nightly  Qty: 30 capsule, Refills: 3      Cholecalciferol (VITAMIN D) 2000 units CAPS capsule Take 2,000 Units by mouth daily       calcium carbonate (OSCAL) 500 MG TABS tablet Take 500 mg by mouth daily      pravastatin (PRAVACHOL) 40 MG tablet Take 20 mg by mouth daily       albuterol (PROVENTIL HFA;VENTOLIN HFA) 108 (90 BASE) MCG/ACT inhaler Inhale 2 puffs into the lungs every 6 hours as needed.       docusate sodium (COLACE) 100 MG capsule Take 100 mg by mouth 2 times daily. Time Spent on discharge is more than 30 minutes in the examination, evaluation, counseling and review of medications and discharge plan. Signed:    JOSR Meza CNP   10/14/2021      Thank you Genaro Acosta 8278 for the opportunity to be involved in this patient's care. If you have any questions or concerns please feel free to contact me at [phone].

## 2021-10-14 NOTE — CARE COORDINATION
Case Management  Discharge Summary      DISCHARGE TO: Home with Home Health Care: [facility]     NEW DURABLE MEDICAL EQUIPMENT ORDERED: None pt already has home o2 set up     TRANSPORTATION: [facility] 6:30 pm     NOTIFIED: PATIENT, Pt's wife Bonnie Floyd, RN and [address] FAXED TO:  Binu Mcbride: N-SAULO    PRE-CERTIFICATION OBTAINED: N-A    NURSE RESPONSIBLE FOR COMPLETION OF PAGE ONE OF CONTINUITY OF CARE ([address]) FORM, RECONCILIATION OF MEDICATIONS, AND TO PLACE A COPY OF FORMS IN PATIENT'S HARD CHART. NURSE TO ENSURE THAT ANY WRITTEN PRESCRIPTIONS ARE GIVEN TO PT UPON DISCHARGE. Patient aware of and agreeable to all arrangements and states no further discharge planning needs.

## 2021-10-14 NOTE — DISCHARGE INSTR - COC
from Last 1 Encounters:   10/11/21 176 lb 5.9 oz (80 kg)     Mental Status:  {IP PT MENTAL STATUS::::0}    IV Access:  { DARION IV ACCESS:848112325:::0}    Nursing Mobility/ADLs:  Walking   {CHP DME ADLs:983651974:::0}  Transfer  {CHP DME ADLs:660067236:::0}  Bathing  {CHP DME ADLs:759215783:::0}  Dressing  {CHP DME ADLs:513449141:::0}  Toileting  {CHP DME ADLs:173782344:::0}  Feeding  {CHP DME ADLs:203917431:::0}  Med Admin  {CHP DME ADLs:291690401:::0}  Med Delivery   { DARION MED Delivery:039749773:::0}    Wound Care Documentation and Therapy:        Elimination:  Continence:   · Bowel: {YES / ND:98422}  · Bladder: {YES / BY:85055}  Urinary Catheter: {Urinary Catheter:187344051:::0}   Colostomy/Ileostomy/Ileal Conduit: {YES / HU:58756}       Date of Last BM: ***    Intake/Output Summary (Last 24 hours) at 10/14/2021 1126  Last data filed at 10/13/2021 1955  Gross per 24 hour   Intake 120 ml   Output    Net 120 ml     I/O last 3 completed shifts:   In: 320 [P.O.:320]  Out: -     Safety Concerns:     [address] Safety Concerns:417810032:::0}    Impairments/Disabilities:      [address] Impairments/Disabilities:463284121:::0}    Nutrition Therapy:  Current Nutrition Therapy:   [address] Diet List:923273292:::0}    Routes of Feeding: {CHP DME Other Feedings:062955968:::0}  Liquids: {Slp liquid thickness:25785}  Daily Fluid Restriction: {CHP DME Yes amt example:777993944:::0}  Last Modified Barium Swallow with Video (Video Swallowing Test): {Done Not Done PXAP:762110445:::5}    Treatments at the Time of Hospital Discharge:   Respiratory Treatments: ***  Oxygen Therapy:  {Therapy; copd oxygen:45131:::0}  Ventilator:    {[facility] Vent List:561607353:::0}    Rehab Therapies: {THERAPEUTIC INTERVENTION:2309470129}  Weight Bearing Status/Restrictions: [address] Weight Bearin:::0}  Other Medical Equipment (for information only, NOT a DME order):  {EQUIPMENT:446454375}  Other Treatments: ***    Patient's personal belongings (please select all that are sent with patient):  {CHP DME Belongings:753029047:::0}    RN SIGNATURE:  {Esignature:806862762:::0}    CASE MANAGEMENT/SOCIAL WORK SECTION    Inpatient Status Date: 10/9/21     Readmission Risk Assessment Score:  Readmission Risk              Risk of Unplanned Readmission:  21           Discharging to Facility/ Agency   [address] [place] [address]         Phone: [phone]       Fax: [phone]          Dialysis Facility (if applicable) N/A  · Name:  · Address:  · Dialysis Schedule:  · Phone:  · Fax:    / signature: Electronically signed by Christiano Angel RN on 10/14/21 at 1:17 PM EDT    PHYSICIAN SECTION    Prognosis: Guarded    Condition at Discharge: Stable    Rehab Potential (if transferring to Rehab): {Prognosis:7511711332:::0}    Recommended Labs or Other Treatments After Discharge:   Recommended Follow-up, Labs or Other Treatments After Discharge:    Home care, PT/OT  Consider palliative care/ hospice                Physician Certification: I certify the above information and transfer of Jeffrey Manriquez  is necessary for the continuing treatment of the diagnosis listed and that he requires [address] for greater 30 days.      Update Admission H&P: No change in H&P    PHYSICIAN SIGNATURE:  Electronically signed by JOSR Byers CNP on 10/14/21 at 11:27 AM EDT

## 2021-10-14 NOTE — PLAN OF CARE
Problem: Skin Integrity:  Goal: Will show no infection signs and symptoms  Description: Will show no infection signs and symptoms  10/14/2021 1749 by Geoff Samuel RN  Outcome: Completed  10/14/2021 1749 by Geoff Samuel RN  Outcome: Ongoing

## 2021-10-14 NOTE — CARE COORDINATION
10/14/21 Unable to get pt [address] until 10/20/21 due to the pt's location, Covid policy and limited [address] [facility]'s [facility] code. I spoke to the pt's wife and expressed my concerns for his safety at home and the maximum level of care he will require and the fact he will not have [address] until 10/20. Pt's wife states understanding of my concerns, however she still wants the pt home. Pt's wife states their son will be there for two weeks helping her. He is taking off work. I called pt's son and he echoed what his mother said. They are refusing SNF and feel confident they can provide the level of care he needs at home. The pt's d/c will proceed.

## 2021-10-15 ENCOUNTER — CARE COORDINATION (OUTPATIENT)
Dept: CASE MANAGEMENT | Age: 86
End: 2021-10-15

## 2021-10-15 NOTE — CARE COORDINATION
[facility] Transitions Initial Follow Up Call    Call within 2 business days of discharge: Yes    Patient: Carolian Parra Patient : 1934   MRN: [id number]  Reason for Admission: ARF d/t Covid  Discharge Date: 10/14/21 RARS: Readmission Risk Score: 24      Last Discharge [facility]       Complaint Diagnosis Description Type Department Provider    10/9/21 Positive For Covid-19 Hypoxia . .. ED to Hosp-Admission (Discharged) (ADMITTED) Audrey Garcia MD; Arvind Roth. .. Spoke with: Carolina Parra gave verbal permission to speak wifeArt: [facility]    Non-face-to-face services provided:  [facility]  Obtained and reviewed discharge summary and/or continuity of care documents  Communication with home health agencies or other community services the patient is currently using-[facility]    Transitions of Care Initial Call    Challenges to be reviewed by the provider   Additional needs identified to be addressed with provider: No  none             Method of communication with provider : phone      Advance Care Planning:   Does patient have an Advance Directive: reviewed and current, reviewed and needs to be updated, not on file; education provided, not on file, patient declined education, decision maker updated and referral to internal ACP facilitator. Was this a readmission? No  Patient stated reason for admission: ARF d/t covid  Patients top risk factors for readmission: medical condition-covid    Care Transition Nurse (CTN) contacted the patient by telephone to perform post hospital discharge assessment. Verified name and  with family as identifiers. Provided introduction to self, and explanation of the CTN role. CTN reviewed discharge instructions, medical action plan and red flags with family who verbalized understanding. Family given an opportunity to ask questions and does not have any further questions or concerns at this time.  Were discharge instructions available to patient? Yes. Reviewed appropriate site of care based on symptoms and resources available to patient including: PCP. The patient agrees to contact the PCP office for questions related to their healthcare. Medication reconciliation was performed with family, who verbalizes understanding of administration of home medications. Advised obtaining a 90-day supply of all daily and as-needed medications. Covid Risk Education     Educated patient about risk for severe COVID-19 due to risk factors according to CDC guidelines. CTN reviewed discharge instructions, medical action plan and red flag symptoms with the family who verbalized understanding. Discussed COVID vaccination status: Yes. Education provided on COVID-19 vaccination as appropriate. Discussed exposure protocols and quarantine with CDC Guidelines. Family was given an opportunity to verbalize any questions and concerns and agrees to contact CTN or health care provider for questions related to their healthcare. Reviewed and educated family on any new and changed medications related to discharge diagnosis. Patient answered call and given to wife, Irina Cuba to speak to CTN. Patient goes by \"Handy\" and wife agreeable to transition call. Wife states that son, Malinda Barrientos" is at pharmacy to  new prescriptions for patient. Amelia Moore is strong support for patient and wife and currently taking 2 weeks off work to help with patient. Irina Cuba is primary caregiver for patient and aware of all medications. Full medication reconciliation completed and wife to [facility] to [facility] when son returns from pharmacy. [facility] and will schedule [facility] visit today. CTN reviewed discharge summary and [facility] is ordered on 10/20/21 for [facility]. Wife is aware and has agency number due to patient has been active with [address] [place] [address] and nurse, Lavelle. Nurse has already called wife and aware of [facility] date.   CTN noted palliative care/hospice on d/c summary and Lindsey Ma asking about Hospice services. CTN provided palliative care/hospice role and services provided by role. Answered all questions. CTN provided Lindsey Ma with agency name/numbers for [employer], and [facility]. Lindsey Ma to speak to son and reach out to agencies for possible home visit and interview. Denies any acute needs at present time. Agreeable to f/u calls. Educated on the use of urgent care or physicians 24 hr access line if assistance is needed after hours. CTN provided contact information. Plan for follow-up call in 5-7 days based on severity of symptoms and risk factors. Care Transitions 24 Hour Call    Do you have any ongoing symptoms?: No  Do you have a copy of your discharge instructions?: Yes  Do you have all of your prescriptions and are they filled?: Yes  Have you been contacted by a [address]?: No  Were you discharged with any Home Care or Post Acute Services: Yes  Post Acute Services: Home Health  Do you feel like you have everything you need to keep you well at home?: Yes  Care Transitions Interventions         Follow Up  No future appointments.     Dennis Alberts RN

## 2021-10-22 ENCOUNTER — CARE COORDINATION (OUTPATIENT)
Dept: CASE MANAGEMENT | Age: 86
End: 2021-10-22

## 2021-10-22 NOTE — CARE COORDINATION
[facility] Transitions Follow Up Call    10/22/2021    Patient: Reese Moreno  Patient : 1934   MRN: [id number]  Reason for Admission: ARF d/t covid  Discharge Date: 10/14/21 RARS: Readmission Risk Score: 24         Spoke with: wife, Corrine Mack  HIPAA form verified on system    Patient's wife answered call and verified patient's . Corrine Mack stated that patient continues to be weak and minimal ability to take care of himself. Corrine Mack has home care service active now, but going to reach out to hospice for visit. Corrine Mack has spoken to family and feel that hospice may be appropriate route for patient. Corrine Mack requiring further assistance with patient than she is able to provide. Corrine Mack is going to request home visit next week. Denies any acute needs at present time. Agreeable to f/u calls. Educated on the use of urgent care or physicians 24 hr access line if assistance is needed after hours. Care Transitions Subsequent and Final Call    Subsequent and Final Calls  Do you have any ongoing symptoms?: No  Have your medications changed?: No  Do you have any questions related to your medications?: No  Do you currently have any active services?: Yes  Are you currently active with any services?: Home Health  Do you have any needs or concerns that I can assist you with?: No  Identified Barriers: None  Care Transitions Interventions  Other Interventions: Follow Up  No future appointments.     Chester Leyva RN

## 2021-10-29 ENCOUNTER — CARE COORDINATION (OUTPATIENT)
Dept: CASE MANAGEMENT | Age: 86
End: 2021-10-29